# Patient Record
Sex: FEMALE | Race: WHITE | NOT HISPANIC OR LATINO | Employment: FULL TIME | URBAN - METROPOLITAN AREA
[De-identification: names, ages, dates, MRNs, and addresses within clinical notes are randomized per-mention and may not be internally consistent; named-entity substitution may affect disease eponyms.]

---
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
## 2022-01-08 ENCOUNTER — HOSPITAL ENCOUNTER (EMERGENCY)
Facility: CLINIC | Age: 22
Discharge: HOME | End: 2022-01-09
Attending: EMERGENCY MEDICINE | Admitting: EMERGENCY MEDICINE
Payer: OTHER GOVERNMENT

## 2022-01-08 DIAGNOSIS — U07.1 INFECTION DUE TO 2019 NOVEL CORONAVIRUS: ICD-10-CM

## 2022-01-08 DIAGNOSIS — R07.9 CHEST PAIN, UNSPECIFIED TYPE: ICD-10-CM

## 2022-01-08 PROCEDURE — 93005 ELECTROCARDIOGRAM TRACING: CPT

## 2022-01-08 PROCEDURE — 96374 THER/PROPH/DIAG INJ IV PUSH: CPT

## 2022-01-08 PROCEDURE — 99285 EMERGENCY DEPT VISIT HI MDM: CPT | Mod: 25

## 2022-01-09 ENCOUNTER — PATIENT OUTREACH (OUTPATIENT)
Dept: CARE COORDINATION | Facility: CLINIC | Age: 22
End: 2022-01-09

## 2022-01-09 ENCOUNTER — APPOINTMENT (OUTPATIENT)
Dept: GENERAL RADIOLOGY | Facility: CLINIC | Age: 22
End: 2022-01-09
Attending: EMERGENCY MEDICINE
Payer: OTHER GOVERNMENT

## 2022-01-09 VITALS
SYSTOLIC BLOOD PRESSURE: 122 MMHG | RESPIRATION RATE: 20 BRPM | TEMPERATURE: 98.7 F | OXYGEN SATURATION: 99 % | HEART RATE: 82 BPM | DIASTOLIC BLOOD PRESSURE: 56 MMHG

## 2022-01-09 LAB
ALBUMIN SERPL-MCNC: 3.7 G/DL (ref 3.4–5)
ALP SERPL-CCNC: 61 U/L (ref 40–150)
ALT SERPL W P-5'-P-CCNC: 24 U/L (ref 0–50)
ANION GAP SERPL CALCULATED.3IONS-SCNC: 5 MMOL/L (ref 3–14)
AST SERPL W P-5'-P-CCNC: 11 U/L (ref 0–45)
B-HCG FREE SERPL-ACNC: <5 IU/L (ref 0–5)
BASOPHILS # BLD AUTO: 0.1 10E3/UL (ref 0–0.2)
BASOPHILS NFR BLD AUTO: 1 %
BILIRUB SERPL-MCNC: 0.2 MG/DL (ref 0.2–1.3)
BUN SERPL-MCNC: 14 MG/DL (ref 7–30)
CALCIUM SERPL-MCNC: 9 MG/DL (ref 8.5–10.1)
CHLORIDE BLD-SCNC: 108 MMOL/L (ref 94–109)
CO2 SERPL-SCNC: 24 MMOL/L (ref 20–32)
CREAT SERPL-MCNC: 0.78 MG/DL (ref 0.52–1.04)
D DIMER PPP FEU-MCNC: <0.27 UG/ML FEU (ref 0–0.5)
EOSINOPHIL # BLD AUTO: 0.1 10E3/UL (ref 0–0.7)
EOSINOPHIL NFR BLD AUTO: 1 %
ERYTHROCYTE [DISTWIDTH] IN BLOOD BY AUTOMATED COUNT: 12.6 % (ref 10–15)
GFR SERPL CREATININE-BSD FRML MDRD: >90 ML/MIN/1.73M2
GLUCOSE BLD-MCNC: 85 MG/DL (ref 70–99)
HCT VFR BLD AUTO: 43.6 % (ref 35–47)
HGB BLD-MCNC: 14.3 G/DL (ref 11.7–15.7)
IMM GRANULOCYTES # BLD: 0 10E3/UL
IMM GRANULOCYTES NFR BLD: 0 %
LYMPHOCYTES # BLD AUTO: 3.4 10E3/UL (ref 0.8–5.3)
LYMPHOCYTES NFR BLD AUTO: 42 %
MCH RBC QN AUTO: 29.6 PG (ref 26.5–33)
MCHC RBC AUTO-ENTMCNC: 32.8 G/DL (ref 31.5–36.5)
MCV RBC AUTO: 90 FL (ref 78–100)
MONOCYTES # BLD AUTO: 0.7 10E3/UL (ref 0–1.3)
MONOCYTES NFR BLD AUTO: 9 %
NEUTROPHILS # BLD AUTO: 3.8 10E3/UL (ref 1.6–8.3)
NEUTROPHILS NFR BLD AUTO: 47 %
NRBC # BLD AUTO: 0 10E3/UL
NRBC BLD AUTO-RTO: 0 /100
PLATELET # BLD AUTO: 273 10E3/UL (ref 150–450)
POTASSIUM BLD-SCNC: 3.9 MMOL/L (ref 3.4–5.3)
PROT SERPL-MCNC: 7.5 G/DL (ref 6.8–8.8)
RBC # BLD AUTO: 4.83 10E6/UL (ref 3.8–5.2)
SODIUM SERPL-SCNC: 137 MMOL/L (ref 133–144)
TROPONIN I SERPL HS-MCNC: 3 NG/L
WBC # BLD AUTO: 8.1 10E3/UL (ref 4–11)

## 2022-01-09 PROCEDURE — 36415 COLL VENOUS BLD VENIPUNCTURE: CPT | Performed by: EMERGENCY MEDICINE

## 2022-01-09 PROCEDURE — 85025 COMPLETE CBC W/AUTO DIFF WBC: CPT | Performed by: EMERGENCY MEDICINE

## 2022-01-09 PROCEDURE — 85379 FIBRIN DEGRADATION QUANT: CPT | Performed by: EMERGENCY MEDICINE

## 2022-01-09 PROCEDURE — 250N000011 HC RX IP 250 OP 636: Performed by: EMERGENCY MEDICINE

## 2022-01-09 PROCEDURE — 84484 ASSAY OF TROPONIN QUANT: CPT | Performed by: EMERGENCY MEDICINE

## 2022-01-09 PROCEDURE — 80053 COMPREHEN METABOLIC PANEL: CPT | Performed by: EMERGENCY MEDICINE

## 2022-01-09 PROCEDURE — 84702 CHORIONIC GONADOTROPIN TEST: CPT

## 2022-01-09 PROCEDURE — 71045 X-RAY EXAM CHEST 1 VIEW: CPT

## 2022-01-09 RX ORDER — KETOROLAC TROMETHAMINE 15 MG/ML
15 INJECTION, SOLUTION INTRAMUSCULAR; INTRAVENOUS ONCE
Status: COMPLETED | OUTPATIENT
Start: 2022-01-09 | End: 2022-01-09

## 2022-01-09 RX ADMIN — KETOROLAC TROMETHAMINE 15 MG: 15 INJECTION, SOLUTION INTRAMUSCULAR; INTRAVENOUS at 00:53

## 2022-01-09 ASSESSMENT — ENCOUNTER SYMPTOMS
COUGH: 1
ABDOMINAL PAIN: 0
DIARRHEA: 0
CHILLS: 1
VOMITING: 1
NAUSEA: 0
HEADACHES: 0
FEVER: 1
SHORTNESS OF BREATH: 1
LIGHT-HEADEDNESS: 1

## 2022-01-09 NOTE — PROGRESS NOTES
Clinic Care Coordination Contact    Care Coordination ED Discharge Follow up Note    Patient referred for Virtual Home Monitoring Program for COVID-19.     Called and left message for patient encouraging them to schedule virtual visit with PCP and to watch for invitation from [employer] Phone number to reach both [facility] primary scheduling and Nurse Advisor line ([phone]) reviewed on message.      Farhana Jack RN  [facility]  - Clinic Care Coordinator

## 2022-01-09 NOTE — ED PROVIDER NOTES
History   Chief Complaint:  Chest Pain and Shortness of Breath       HPI   Belen Dunlap is a 21 year old female with history of anxiety, diagnosed with COVID-19 1/6/2022 presenting with chest pain and dyspnea.  Patient reports fever, chills and cough over the past few days though for the past day developing worsening dyspnea predominantly with coughing and on exertion as well as a chest pain.  She describes it she feels a constant chest pressure as though someone is sitting on her chest and it is difficult to inhale.  She denies any abdominal pain, diarrhea, headache and dysuria but does admit to an episode of vomiting, general fatigue and lightheadedness.  Patient admits to COVID vaccination as well as having her booster.  She denies any family history of early MI, underlying lung issues or blood clots though does admit to having Nexplanon.    Review of Systems   Constitutional: Positive for chills and fever.   Respiratory: Positive for cough and shortness of breath.    Cardiovascular: Positive for chest pain.   Gastrointestinal: Positive for vomiting. Negative for abdominal pain, diarrhea and nausea.   Neurological: Positive for light-headedness. Negative for headaches.   All other systems reviewed and are negative.        Allergies:  No Known Allergies    Medications:    albuterol (PROAIR HFA/PROVENTIL HFA/VENTOLIN HFA) 108 (90 Base) MCG/ACT inhaler  ARIPiprazole (ABILIFY) 20 MG tablet  NORTREL 0.5/35, 28, 0.5-35 MG-MCG tablet  QUEtiapine (SEROQUEL XR) 50 MG TB24 24 hr tablet        Past Medical History:    Anxiety   Depression COVID 19    Past Surgical History:      Past Surgical History:   Procedure Laterality Date     wisdom teeth extraction           Family History:      Family History   Problem Relation Age of Onset     Skin Cancer Mother         basal cell     Hyperlipidemia Father        Social History:  The patient presents to the ED alone  Denies smoking/drug use/ETOH    Physical Exam     Patient  Vitals for the past 24 hrs:   BP Temp Temp src Pulse Resp SpO2   01/08/22 2131 (!) 157/85 98.7  F (37.1  C) Oral 100 22 100 %       Physical Exam  Nursing note and vitals reviewed.  Constitutional: Well nourished. Resting comfortably.   Eyes: Conjunctiva normal.  Pupils are equal, round, and reactive to light.   ENT: Nose normal. Mucous membranes pink and moist.    Neck: Normal range of motion.  CVS: Normal rate, regular rhythm.  Normal heart sounds.  No murmur.  Pulmonary: Lungs clear to auscultation bilaterally. No wheezes/rales/rhonchi.  GI: Abdomen soft. Nontender, nondistended. No rigidity or guarding.    MSK: No calf tenderness or swelling.  Neuro: Alert. Follows simple commands.  Skin: Skin is warm and dry. No rash noted.   Psychiatric: Normal affect.       Emergency Department Course   ECG:  ECG taken at 2147  Normal sinus rhythm  Normal ECG  Rate 85 bpm. CT interval 132 ms. QRS duration 86 ms. QT/QTc 364/433 ms. P-R-T axes 55 62 39.    Imaging:  XR Chest Port 1 View    (Results Pending)     Report per radiology.     Laboratory:  Labs Ordered and Resulted from Time of ED Arrival to Time of ED Departure   D DIMER QUANTITATIVE - Normal       Result Value    D-Dimer Quantitative <0.27     COMPREHENSIVE METABOLIC PANEL - Normal    Sodium 137      Potassium 3.9      Chloride 108      Carbon Dioxide (CO2) 24      Anion Gap 5      Urea Nitrogen 14      Creatinine 0.78      Calcium 9.0      Glucose 85      Alkaline Phosphatase 61      AST 11      ALT 24      Protein Total 7.5      Albumin 3.7      Bilirubin Total 0.2      GFR Estimate >90     TROPONIN I - Normal    Troponin I High Sensitivity 3     ISTAT HCG QUANTITATIVE PREGNANCY POCT - Normal    HCG QUANTITATIVE POCT <5.0     CBC WITH PLATELETS AND DIFFERENTIAL    WBC Count 8.1      RBC Count 4.83      Hemoglobin 14.3      Hematocrit 43.6      MCV 90      MCH 29.6      MCHC 32.8      RDW 12.6      Platelet Count 273      % Neutrophils 47      % Lymphocytes 42       % Monocytes 9      % Eosinophils 1      % Basophils 1      % Immature Granulocytes 0      NRBCs per 100 WBC 0      Absolute Neutrophils 3.8      Absolute Lymphocytes 3.4      Absolute Monocytes 0.7      Absolute Eosinophils 0.1      Absolute Basophils 0.1      Absolute Immature Granulocytes 0.0      Absolute NRBCs 0.0         Emergency Department Course:  Reviewed:  I reviewed nursing notes, vitals, and past medical history    Assessments:   I performed a physical exam of the patient. Findings as above.     Patient rechecked and updated. Plan of care discussed and questions answered.       Interventions:  Medications   ketorolac (TORADOL) injection 15 mg (15 mg Intravenous Given 1/9/22 0053)       Disposition:  The patient was discharged to home.     Impression & Plan       Covid-19  Belen Dunlap was evaluated during a global COVID-19 pandemic, which necessitated consideration that the patient might be at risk for infection with the SARS-CoV-2 virus that causes COVID-19.   Applicable protocols for evaluation were followed during the patient's care.   COVID-19 was considered as part of the patient's evaluation. The plan for testing is:  a test was obtained at a previous visit and reviewed & considered today.      Medical Decision Making:  Belen Dunlap is a 21 year old female who was recently diagnosed with COVID-19 presenting with a myriad of complaints though predominantly chest pain and dyspnea.  She is nontoxic, clinically well-hydrated, in no significant distress on arrival.  EKG without focal ischemia or underlying arrhythmia.  High-sensitivity chest screening troponin negative.  Her presentation would be atypical of ACS given chronicity of symptoms.  D-dimer negative, clinically doubt PE.  Chest x-ray without focal pneumonia, fluid overload, widened mediastinum or pneumothorax.  Patient has no abdominal tenderness and I doubt intra-abdominal source to explain her pain.  Labs overall reassuring without  evidence to suggest underlying sepsis, profound anemia or significant electrolyte derangements.  Strong suspicion that her chest pain may be secondary to more pleurisy.  Patient did report symptom improvement after IV Toradol.  She ambulates without hypoxia or significant work of breathing.  I did recommend Tylenol/Motrin as needed with continued p.o. hydration.  Patient will be discharged home with COVID get well referral loop.  Monitor pulse ox and should it be under 90%, if she develop worsening chest pain or dyspnea to promptly represent to the ED.  All questions addressed.    Diagnosis:    ICD-10-CM    1. Infection due to 2019 novel coronavirus  U07.1    2. Chest pain, unspecified type  R07.9        Discharge Medications:  New Prescriptions    No medications on file            Haley Franco,   01/09/22 0140

## 2022-01-09 NOTE — ED TRIAGE NOTES
Pt arrives with c/o chest heaviness and SOB. Pt tested positive for COVID on Thursday. Last tylenol this morning. ABCs intact in triage.

## 2022-01-09 NOTE — DISCHARGE INSTRUCTIONS
Discharge Instructions  COVID-19    COVID-19 is the disease caused by a new coronavirus. The virus spreads from person-to-person primarily by droplets when an infected person coughs or sneezes and the droplets are then breathed in by another person. There are tests available to diagnose COVID-19. You may have been diagnosed with COVID, may be being tested for COVID and have a pending test result, or may have been exposed to COVID.    Symptoms of COVID-19  Many people have no symptoms or mild symptoms.  Symptoms may usually appear 4 to 5 days (up to 14 days) after contact with a person with COVID-19. Some people will get severe symptoms and pneumonia. Usual symptoms are:     ? Fever  ? Cough  ? Trouble breathing    Less common symptoms are: Headache, body aches, sore throat, sneezing, diarrhea, loss of taste or smell.    Isolation and Quarantine    You may have been seen because you have symptoms, had an exposure, or had some other concern about possible COVID. The best way to stop the spread of the virus is to avoid contact with others.    Isolation refers to sick people staying away from people who are not sick. A person in quarantine is limiting activity because they were exposed and are waiting to see if they might become sick.    If you test positive for COVID, you should stay home (isolation) for at least 10 days after your symptoms began, and for 24 hours with no fever and improvement of symptoms--whichever is longer. (Your fever should be gone for 24 hours without using fever-reducing medicine). If you have no symptoms, you should stay home (isolation) for 10 days from the day of the test. If you have been vaccinated for COVID, the vaccination will not cause you to test positive so a positive test result generally is a  true positive .    For example, if you have a fever and cough for 6 days, you need to stay home 4 more days with no fever for a total of 10 days. Or, if you have a fever and cough for 10 days,  you need to stay home one more day with no fever for a total of 11 days.    If you have a high-risk exposure to COVID (you spent 15 minutes or more within six feet of somebody who has COVID), you should stay home (quarantine) for 14 days, unless you are vaccinated. Even if you test negative for COVID, the CDC recommends a 14-day quarantine from the time of your last exposure to that individual (unless you are vaccinated). There are options for a shortened (<14 day quarantine) you can review at:  [email or web address]/close.html#long    If you live in the same house as somebody with COVID and cannot separate from them, you will need to quarantine for 14-days after that person's isolation (infectious) period. That means that you may need to quarantine for 24-days after that person became symptomatic/ill.    If you are vaccinated and do not develop symptoms, you do not need to quarantine after exposure.    If you have symptoms but a negative test, you should stay at home until you are symptom-free and without fever for 24 hours, using the same judgment you would for when it is safe to return to work/school from strep throat, influenza, or the common cold. If you worsen, you should consider being re-evaluated.    If you are being tested for COVID because of symptoms and your test is pending, you should stay home until you know your test result.    If I have COVID, how should I protect myself and others?    Do not go to work or school. Have a friend or relative do your shopping. Do not use public transportation (bus, train) or ridesharing (Lyft, Uber).    Separate yourself from other people in your home. As much as possible, you should stay in one room and away from other people in your home. Also, use a separate bathroom, if possible. Avoid handling pets or other animals while sick.     Wear a facemask if you need to be around other people and cover your mouth and nose with a tissue when  you cough or sneeze.     Avoid sharing personal household items. You should not share dishes, drinking glasses, forks/knives/spoons, towels, or bedding with other people in your home. After using these items, they should be washed with soap and water. Clean parts of your home that are touched often (doorknobs, faucets, countertops, etc.) daily.     Wash your hands often with soap and water for at least 20 seconds or use an alcohol-based hand  containing at least 60% alcohol.     Avoid touching your face.    Treat your symptoms. You can take Acetaminophen (Tylenol) to treat body aches and fever as needed for comfort. Ibuprofen (Advil or Motrin) can be used as well if you still have symptoms after taking Tylenol. Drink fluids. Rest.    Watch for worsening symptoms such as shortness of breath/difficulty breathing or very severe weakness.    Employers/workplaces are being asked by the Centers for Disease Control (CDC) to not request notes/documentation for you to return to work or prove that you were ill. You may choose to show your employer this paperwork. Also, repeat testing should not be required to return to work.    Exercise/Sports in rare cases, COVID could affect your heart in a way that makes exercise or participation in sports dangerous.    If you have a mild COVID illness (fever, cough, sore throat, and similar symptoms but no difficulty breathing or abnormalities of the lung): After your COVID symptoms have resolved, wait 14-days before returning to activity.  If you have more than a mild illness (meaning that you have problems with your breathing or lungs) or if you participate in competitive or strenuous activity or have a history of heart disease: Please see your primary doctor/provider prior to return to activity/competition.    Antibody treatments are available for patients with mild to moderate COVID illness in order to prevent severe illness. In general, only patients with risk factors for  severe illness are eligible for treatment. For more information, to see if you are eligible, and to find treatment, go to the [facility]:  [email or web address]/[email or web address]     Return to the Emergency Department if:    If you are developing worsening breathing, shortness of breath, or feel worse you should seek medical attention.  If you are uncertain, contact your health care provider/clinic. If you need emergency medical attention, call 911 and tell them you have been ill.

## 2022-01-10 LAB
ATRIAL RATE - MUSE: 85 BPM
DIASTOLIC BLOOD PRESSURE - MUSE: NORMAL MMHG
INTERPRETATION ECG - MUSE: NORMAL
P AXIS - MUSE: 55 DEGREES
PR INTERVAL - MUSE: 132 MS
QRS DURATION - MUSE: 86 MS
QT - MUSE: 364 MS
QTC - MUSE: 433 MS
R AXIS - MUSE: 62 DEGREES
SYSTOLIC BLOOD PRESSURE - MUSE: NORMAL MMHG
T AXIS - MUSE: 39 DEGREES
VENTRICULAR RATE- MUSE: 85 BPM

## 2023-01-25 NOTE — PHARMACY-ADMISSION MEDICATION HISTORY
Admission medication history interview status for the 7/26/2020 admission is complete. See Epic admission navigator for allergy information, pharmacy, prior to admission medications and immunization status.     Medication history interview sources:    - Patient , and dispense history     Changes made to PTA medication list (reason)  Added:   - Albuterol 108 ( 90 base) MCG/ACT inhaler   Deleted:   - none   Changed:   - none     Additional medication history information (including reliability of information, actions taken by pharmacist)  - Patient was historian and knows her medications well.        Prior to Admission medications    Medication Sig Last Dose Taking? Auth Provider   albuterol (PROAIR HFA/PROVENTIL HFA/VENTOLIN HFA) 108 (90 Base) MCG/ACT inhaler Inhale 2 puffs into the lungs every 6 hours as needed for shortness of breath / dyspnea or wheezing Past Week Yes Unknown, Entered By History   ARIPiprazole (ABILIFY) 20 MG tablet Take 20 mg by mouth daily 7/25/2020 at Unknown time Yes Reported, Patient   NORTREL 0.5/35, 28, 0.5-35 MG-MCG tablet Take 0.5-35 mg by mouth daily 7/25/2020 at Unknown time Yes Reported, Patient   omeprazole (PRILOSEC) 20 MG DR capsule Take 20 mg by mouth daily 7/25/2020 at Unknown time Yes Reported, Patient   QUEtiapine (SEROQUEL XR) 50 MG TB24 24 hr tablet Take 50 mg by mouth daily 7/25/2020 at Unknown time Yes Reported, Patient         Medication history completed by: Jacob Kunz PD3   Cuca Perez Yesy Akers 1996               OFFICE NOTE          Mian Hernandez   1951  [id number]    Date/Time:  1/26/2023  Cardiologist:  Rafiq Reynaga M.D.,ZOE.    SUBJECTIVE:  Pleasantly forgetful. His memory is significantly impaired. His family tells me he does not complain of chest pain, palpitations or shortness of breath. Reviewed stress test results - with partial reversibility around infarction in the presence of no anginal symptoms we all agreed to just continue medical management of CAD and not to proceed with invasive procedures. BP well controlled, lipids at goal.  Discussed results of abdominal duplex as well which did not show any new aneurysms. Assessment/Plan:    1. CAD: Status post MI in 2018 status post PCI of the circumflex in 2018 in [place]. At the time the cardiac catheterization revealed an ejection fraction of 50%, 20% left main stenosis 70% distal LAD and 50% mid RCA stenosis and 40% of PDA. Once again the circumflex completely occluded at that time and he underwent a PCI with a 3.0 x 16 mm Synergy stent. Discussed nuclear stress test results as below and decided to proceed with medical management in the absence of anginal symptoms. Continue ASA, plavix, coreg, statin. He will follow up with Dr. Eloy Montiel again in 6 months. NUCLEAR CARDIAC STRESS TEST 01/25/2023     Interpretation Summary    Nuclear Findings: There is a mild severity left ventricular stress perfusion defect that is small to medium in size present in the basal to mid anterolateral segment(s) that is partially reversible. There is normal wall motion in the defect area. The defect appears to be probable infarction and probable ischemia. Nuclear Findings: Normal left ventricular systolic function post-stress. ECG: Resting ECG demonstrates normal sinus rhythm and incomplete right bundle branch block. ECG: Stress ECG was negative for ischemia.     Stress Test: A pharmacological stress test was performed using lexiscan. Hemodynamics are adequate for diagnosis. Blood pressure demonstrated a normal response and heart rate demonstrated a normal response to stress. The patient's heart rate recovery was normal.    Post-stress ejection fraction is 60%. Stress Combined Conclusion: mild ischemia with mix infarction at the level of the proximal to mid high lateral wall EF 60%    Signed by: Jono Mayorga MD on 1/25/2023  4:49 PM      2. History of distal thoracic aortic and abdominal aneurysm status post stenting in 2018 - recent abdominal duplex did not show any evidence of abdominal aorta dilatation with maximum diameter of 2.4cm, distal stents is not well visualized     3. Memory impairment: Significant. 4.  Hypertension: Well controlled today, he will continue hydralazine 50mg TID, coreg    5. Hyperlipidemia: Continue Lipitor. Closely followed by his primary care physician. LDL goal less than 70 and last LDL was 60      HPI   Patient is a 73-year-old male recently relocated from [facility] with a past medical history markable for hypertension, hyperlipidemia, coronary disease, aortic disease, and memory impairment presents today for routine follow-up. Unfortunately I do not have at this time any records from [facility] and the patient is not able to tell me much about his past medical history. Nonetheless he seems to be relatively asymptomatic from the cardiac standpoint. CARDIAC STUDIES    01/24/23    NUCLEAR CARDIAC STRESS TEST 01/25/2023 1/25/2023    Interpretation Summary    Nuclear Findings: There is a mild severity left ventricular stress perfusion defect that is small to medium in size present in the basal to mid anterolateral segment(s) that is partially reversible. There is normal wall motion in the defect area. The defect appears to be probable infarction and probable ischemia.     Nuclear Findings: Normal left ventricular systolic function post-stress. ECG: Resting ECG demonstrates normal sinus rhythm and incomplete right bundle branch block. ECG: Stress ECG was negative for ischemia. Stress Test: A pharmacological stress test was performed using lexiscan. Hemodynamics are adequate for diagnosis. Blood pressure demonstrated a normal response and heart rate demonstrated a normal response to stress. The patient's heart rate recovery was normal.    Post-stress ejection fraction is 60%. Stress Combined Conclusion: mild ischemia with mix infarction at the level of the proximal to mid high lateral wall EF 60%    Signed by: Paul Sánchez MD on 1/25/2023  4:49 PM        EKG Results       Procedure 720 Value Units Date/Time    AMB POC EKG ROUTINE W/ 12 LEADS, INTER & REP [[id number]] Resulted: 01/26/23 1407    Order Status: Completed Updated: 01/26/23 1407            IMAGING      MRI Results (most recent):  Results from [place] encounter on 01/20/22    MRI LUMB SPINE WO CONT    Narrative  EXAM: MRI LUMB SPINE WO CONT    INDICATION: . Radiculopathy, lumbar region    COMPARISON: None    TECHNIQUE: MR imaging of the lumbar spine was performed using the following  sequences: sagittal T1, T2, STIR;  axial T1, T2.    CONTRAST:  None. FINDINGS:  Vertebral body heights are maintained. There is no listhesis. There is no acute marrow replacement. The lumbar spinal canal is small on a congenital basis. The conus medullaris terminates at T12. Signal and caliber of the distal spinal  cord are within normal limits. Lower thoracic spine: No herniation or stenosis. L1-L2: No herniation or stenosis. L2-L3: No herniation or stenosis. L3-L4: Mild spinal stenosis. Diffuse disc bulge. L4-L5: Severe spinal stenosis. Large focal disc protrusion arising centrally and  extending, foraminal and extra foraminal. Facet arthrosis. Bilateral foraminal  stenosis. Severe disc degeneration. L5-S1: Mild to moderate spinal stenosis.  Diffuse disc bulge. Facet arthrosis. Bilateral foraminal stenosis. Severe disc degeneration. Impression  Multilevel degenerative disc/facet disease as detailed, severe at  L4-5. CT Results (most recent):  No results found for this or any previous visit. XR Results (most recent):  Results from Hospital Encounter encounter on 11/22/21    XR SPINE LUMB 2 OR 3 V    Narrative  Lumbar spine 3 views dated 11/20/2021    History is chronic midline low back pain    AP, lateral and spot lateral views of the lumbar spine were obtained. There is  evidence of mild lumbar scoliosis convex to the left. There is evidence of  lumbar spondylosis. Marked intervertebral disc space narrowing is noted at the  L4-5 level as well as the L5-S1 level. A vacuum phenomenon is noted at the L4-5  level. There is evidence of sclerotic change involving the adjacent endplates. This is indicative of degenerative disc disease. There is no evidence of  spondylolisthesis. Metallic stents are associated with the abdominal aorta and proximal iliac  arteries    Impression  1. Evidence of moderate degenerative change involving the lumbar spine. 2. Evidence of lumbar scoliosis convex to the left. No past medical history on file. No past surgical history on file. Social History     Tobacco Use    Smoking status: Former     Packs/day: 0.50     Years: 39.00     Pack years: 19.50     Types: Cigarettes     Start date: 4/12/1970     Quit date: 4/12/2019     Years since quitting: 3.7    Smokeless tobacco: Former   Vaping Use    Vaping Use: Never used   Substance Use Topics    Alcohol use: Not Currently    Drug use: Never     No family history on file.   No Known Allergies      Visit Vitals  /78 (BP 1 Location: Left upper arm, BP Patient Position: Sitting, BP Cuff Size: Adult)   Pulse 89   Resp 18   Ht 5' 10\" (1.778 m)   Wt 171 lb 12.8 oz (77.9 kg)   SpO2 98%   BMI 24.65 kg/m²         Last 3 Recorded Weights in this Encounter    01/26/23 1359 Weight: 171 lb 12.8 oz (77.9 kg)            Review of Systems:   Pertinent items are noted in the History of Present Illness. Visit Vitals  /78 (BP 1 Location: Left upper arm, BP Patient Position: Sitting, BP Cuff Size: Adult)   Pulse 89   Resp 18   Ht 5' 10\" (1.778 m)   Wt 171 lb 12.8 oz (77.9 kg)   SpO2 98%   BMI 24.65 kg/m²     General Appearance:  Well developed, well nourished,alert and oriented x 3, and individual in no acute distress. Ears/Nose/Mouth/Throat:   Hearing grossly normal.         Neck: Supple. Chest:   Lungs clear to auscultation bilaterally. Cardiovascular:  Regular rate and rhythm, S1, S2 normal, no murmur. Abdomen:   Soft, non-tender, bowel sounds are active. Extremities: No edema bilaterally. Skin: Warm and dry. Current Outpatient Medications on File Prior to Visit   Medication Sig Dispense Refill    metFORMIN ER (GLUCOPHAGE XR) 500 mg tablet TAKE 1 TABLET(500 MG) BY MOUTH DAILY WITH BREAKFAST 90 Tablet 1    clopidogreL (PLAVIX) 75 mg tab TAKE 1 TABLET BY MOUTH DAILY 90 Tablet 3    LORazepam (ATIVAN) 0.5 mg tablet Take 1 Tablet by mouth two (2) times daily as needed for Agitation (sundowning). Max Daily Amount: 1 mg. 90 Tablet 2    Namenda XR 28 mg capsule Take 1 Capsule by mouth daily. 120 Capsule 3    QUEtiapine (SEROquel) 25 mg tablet Take 12.5 in the AM and 12.5 mg  Tablet 3    carvediloL (COREG) 6.25 mg tablet TAKE 1 TABLET BY MOUTH TWICE DAILY 180 Tablet 3    dutasteride (AVODART) 0.5 mg capsule Take 0.5 mg by mouth daily. atorvastatin (LIPITOR) 40 mg tablet Take 40 mg by mouth nightly. sertraline (ZOLOFT) 100 mg tablet Take 100 mg by mouth daily. tamsulosin (FLOMAX) 0.4 mg capsule Take 0.4 mg by mouth daily. aspirin 81 mg cap Take 1 Tablet by mouth daily. multivitamin (ONE A DAY) tablet Take 1 Tablet by mouth daily. cyanocobalamin 1,000 mcg tablet Take 1,000 mcg by mouth daily.       [DISCONTINUED] hydrALAZINE (APRESOLINE) 50 mg tablet Take 50 mg by mouth three (3) times daily. [DISCONTINUED] hydrALAZINE (APRESOLINE) 25 mg tablet Take 1 Tablet by mouth three (3) times daily. 270 Tablet 1     No current facility-administered medications on file prior to visit. Sylvia Leonardo had no medications administered during this visit. Current Outpatient Medications   Medication Sig    hydrALAZINE (APRESOLINE) 50 mg tablet Take 1 Tablet by mouth three (3) times daily. metFORMIN ER (GLUCOPHAGE XR) 500 mg tablet TAKE 1 TABLET(500 MG) BY MOUTH DAILY WITH BREAKFAST    clopidogreL (PLAVIX) 75 mg tab TAKE 1 TABLET BY MOUTH DAILY    LORazepam (ATIVAN) 0.5 mg tablet Take 1 Tablet by mouth two (2) times daily as needed for Agitation (sundowning). Max Daily Amount: 1 mg. Namenda XR 28 mg capsule Take 1 Capsule by mouth daily. QUEtiapine (SEROquel) 25 mg tablet Take 12.5 in the AM and 12.5 mg PM    carvediloL (COREG) 6.25 mg tablet TAKE 1 TABLET BY MOUTH TWICE DAILY    dutasteride (AVODART) 0.5 mg capsule Take 0.5 mg by mouth daily. atorvastatin (LIPITOR) 40 mg tablet Take 40 mg by mouth nightly. sertraline (ZOLOFT) 100 mg tablet Take 100 mg by mouth daily. tamsulosin (FLOMAX) 0.4 mg capsule Take 0.4 mg by mouth daily. aspirin 81 mg cap Take 1 Tablet by mouth daily. multivitamin (ONE A DAY) tablet Take 1 Tablet by mouth daily. cyanocobalamin 1,000 mcg tablet Take 1,000 mcg by mouth daily. No current facility-administered medications for this visit.          Lab Results   Component Value Date/Time    Cholesterol, total 139 10/12/2022 12:08 PM    HDL Cholesterol 55 10/12/2022 12:08 PM    LDL, calculated 60.8 10/12/2022 12:08 PM    VLDL, calculated 23.2 10/12/2022 12:08 PM    Triglyceride 116 10/12/2022 12:08 PM    CHOL/HDL Ratio 2.5 10/12/2022 12:08 PM       Lab Results   Component Value Date/Time    Sodium 139 10/12/2022 12:08 PM    Potassium 4.8 10/12/2022 12:08 PM    Chloride 108 10/12/2022 12:08 PM CO2 26 10/12/2022 12:08 PM    Anion gap 5 10/12/2022 12:08 PM    Glucose 131 (H) 10/12/2022 12:08 PM    BUN 34 (H) 10/12/2022 12:08 PM    Creatinine 1.32 (H) 10/12/2022 12:08 PM    BUN/Creatinine ratio 26 (H) 10/12/2022 12:08 PM    GFR est AA 59 (L) 11/22/2021 12:00 AM    GFR est non-AA 51 (L) 11/22/2021 12:00 AM    Calcium 9.2 10/12/2022 12:08 PM       Lab Results   Component Value Date/Time    ALT (SGPT) 40 10/12/2022 12:08 PM    Alk.  phosphatase 89 10/12/2022 12:08 PM    Bilirubin, total 0.4 10/12/2022 12:08 PM       Lab Results   Component Value Date/Time    WBC 5.5 10/12/2022 12:08 PM    HGB 10.2 (L) 10/12/2022 12:08 PM    HCT 33.8 (L) 10/12/2022 12:08 PM    PLATELET 438 58/90/6681 12:08 PM    MCV 82.8 10/12/2022 12:08 PM       No results found for: TSH, TSH2, TSH3, TSHP, TSHEXT, TSHEXT      Lab Results   Component Value Date/Time    Cholesterol, total 139 10/12/2022 12:08 PM    Cholesterol, total 141 11/22/2021 12:00 AM    HDL Cholesterol 55 10/12/2022 12:08 PM    HDL Cholesterol 47 11/22/2021 12:00 AM    LDL, calculated 60.8 10/12/2022 12:08 PM    LDL, calculated 70 11/22/2021 12:00 AM    Triglyceride 116 10/12/2022 12:08 PM    Triglyceride 139 11/22/2021 12:00 AM    CHOL/HDL Ratio 2.5 10/12/2022 12:08 PM        ANNI Mliler, [facility]   [address] [place] [address], [address] [place] [address] [address]  [address]  (H) [phone] (XMD) [phone]

## 2023-08-02 ENCOUNTER — OFFICE VISIT (OUTPATIENT)
Dept: URGENT CARE | Facility: URGENT CARE | Age: 23
End: 2023-08-02
Payer: COMMERCIAL

## 2023-08-02 VITALS
OXYGEN SATURATION: 100 % | TEMPERATURE: 98.5 F | BODY MASS INDEX: 32.44 KG/M2 | DIASTOLIC BLOOD PRESSURE: 78 MMHG | HEART RATE: 77 BPM | SYSTOLIC BLOOD PRESSURE: 123 MMHG | HEIGHT: 64 IN | WEIGHT: 190 LBS | RESPIRATION RATE: 16 BRPM

## 2023-08-02 DIAGNOSIS — R07.0 THROAT PAIN: Primary | ICD-10-CM

## 2023-08-02 PROCEDURE — 85048 AUTOMATED LEUKOCYTE COUNT: CPT | Performed by: FAMILY MEDICINE

## 2023-08-02 PROCEDURE — 99203 OFFICE O/P NEW LOW 30 MIN: CPT | Performed by: FAMILY MEDICINE

## 2023-08-02 PROCEDURE — 85004 AUTOMATED DIFF WBC COUNT: CPT | Performed by: FAMILY MEDICINE

## 2023-08-02 PROCEDURE — 36415 COLL VENOUS BLD VENIPUNCTURE: CPT | Performed by: FAMILY MEDICINE

## 2023-08-02 RX ORDER — PREDNISONE 20 MG/1
60 TABLET ORAL DAILY
Qty: 9 TABLET | Refills: 0 | Status: SHIPPED | OUTPATIENT
Start: 2023-08-02 | End: 2023-08-05

## 2023-08-02 NOTE — PATIENT INSTRUCTIONS
Tomorrow start prednisone 60 mg once daily.  Take this every morning for 3 days to reduce swelling in your tonsils.    We will be in touch with the results of your white blood cell count if there is any abnormality.

## 2023-08-03 LAB
BASOPHILS # BLD AUTO: 0.1 10E3/UL (ref 0–0.2)
BASOPHILS NFR BLD AUTO: 1 %
EOSINOPHIL # BLD AUTO: 0.3 10E3/UL (ref 0–0.7)
EOSINOPHIL NFR BLD AUTO: 3 %
IMM GRANULOCYTES # BLD: 0 10E3/UL
IMM GRANULOCYTES NFR BLD: 0 %
LYMPHOCYTES # BLD AUTO: 2.3 10E3/UL (ref 0.8–5.3)
LYMPHOCYTES NFR BLD AUTO: 26 %
MONOCYTES # BLD AUTO: 0.9 10E3/UL (ref 0–1.3)
MONOCYTES NFR BLD AUTO: 10 %
NEUTROPHILS # BLD AUTO: 5.4 10E3/UL (ref 1.6–8.3)
NEUTROPHILS NFR BLD AUTO: 60 %
NRBC # BLD AUTO: 0 10E3/UL
NRBC BLD AUTO-RTO: 0 /100
WBC # BLD AUTO: 8.9 10E3/UL (ref 4–11)

## 2023-08-03 NOTE — PROGRESS NOTES
"  ICD-10-CM    1. Throat pain  R07.0 Streptococcus A Rapid Screen w/Reflex to PCR - Clinic Collect     WBC with Diff     predniSONE (DELTASONE) 20 MG tablet     WBC with Diff        Assessment: Pharyngitis. Rapid strep negative on 7/27/23 from outside clinic. Awaiting WBC w/diff results to help guide treatment  No evidence of peritonsillar abscess at this time.  If WBC elevated and/or significant left shift, would treat with course of Augmentin.    PLAN:  Patient Instructions   Tomorrow start prednisone 60 mg once daily.  Take this every morning for 3 days to reduce swelling in your tonsils.    We will be in touch with the results of your white blood cell count if there is any abnormality.    SUBJECTIVE:  Belen has been having pharyngitis for 1 week. She had been diagnosed with strep in the beginning of July which improved with antibiotics. She did not have any discomfort for a week or 2 and then last week throat pain returned. She describes having chills and night sweats. She denies headache.     OBJECTIVE:  /78   Pulse 77   Temp 98.5  F (36.9  C) (Oral)   Resp 16   Ht 1.626 m (5' 4\")   Wt 86.2 kg (190 lb)   LMP 07/26/2023 (Approximate)   SpO2 100%   BMI 32.61 kg/m    Gen: well appearing, in NAD  ENT: tympanic membranes normal bilaterally, normal mucosa, moderate pharyngeal erythema without exudate. One small tonsillith L tonsil lower anterior area.  Neck: mild anterior cervical LAD noted, no posterior LAD noted  "

## 2023-08-06 ENCOUNTER — HOSPITAL ENCOUNTER (EMERGENCY)
Facility: CLINIC | Age: 23
Discharge: HOME | End: 2023-08-07
Attending: EMERGENCY MEDICINE | Admitting: EMERGENCY MEDICINE
Payer: COMMERCIAL

## 2023-08-06 DIAGNOSIS — J02.9 PHARYNGITIS, UNSPECIFIED ETIOLOGY: ICD-10-CM

## 2023-08-06 PROCEDURE — 36415 COLL VENOUS BLD VENIPUNCTURE: CPT | Performed by: EMERGENCY MEDICINE

## 2023-08-06 PROCEDURE — 96375 TX/PRO/DX INJ NEW DRUG ADDON: CPT

## 2023-08-06 PROCEDURE — 87637 SARSCOV2&INF A&B&RSV AMP PRB: CPT | Performed by: EMERGENCY MEDICINE

## 2023-08-06 PROCEDURE — 96374 THER/PROPH/DIAG INJ IV PUSH: CPT

## 2023-08-06 PROCEDURE — 94640 AIRWAY INHALATION TREATMENT: CPT

## 2023-08-06 PROCEDURE — 250N000011 HC RX IP 250 OP 636: Mod: JZ | Performed by: EMERGENCY MEDICINE

## 2023-08-06 PROCEDURE — 96361 HYDRATE IV INFUSION ADD-ON: CPT

## 2023-08-06 PROCEDURE — 250N000013 HC RX MED GY IP 250 OP 250 PS 637: Performed by: EMERGENCY MEDICINE

## 2023-08-06 PROCEDURE — 84703 CHORIONIC GONADOTROPIN ASSAY: CPT | Performed by: EMERGENCY MEDICINE

## 2023-08-06 PROCEDURE — 99285 EMERGENCY DEPT VISIT HI MDM: CPT | Mod: 25

## 2023-08-06 PROCEDURE — 80048 BASIC METABOLIC PNL TOTAL CA: CPT | Performed by: EMERGENCY MEDICINE

## 2023-08-06 PROCEDURE — 87651 STREP A DNA AMP PROBE: CPT | Performed by: EMERGENCY MEDICINE

## 2023-08-06 PROCEDURE — 85025 COMPLETE CBC W/AUTO DIFF WBC: CPT | Performed by: EMERGENCY MEDICINE

## 2023-08-06 PROCEDURE — 86308 HETEROPHILE ANTIBODY SCREEN: CPT | Performed by: EMERGENCY MEDICINE

## 2023-08-06 PROCEDURE — 258N000003 HC RX IP 258 OP 636: Performed by: EMERGENCY MEDICINE

## 2023-08-06 RX ORDER — ACETAMINOPHEN 325 MG/1
650 TABLET ORAL ONCE
Status: COMPLETED | OUTPATIENT
Start: 2023-08-06 | End: 2023-08-06

## 2023-08-06 RX ORDER — DEXAMETHASONE SODIUM PHOSPHATE 10 MG/ML
10 INJECTION, SOLUTION INTRAMUSCULAR; INTRAVENOUS ONCE
Status: COMPLETED | OUTPATIENT
Start: 2023-08-06 | End: 2023-08-06

## 2023-08-06 RX ORDER — KETOROLAC TROMETHAMINE 15 MG/ML
15 INJECTION, SOLUTION INTRAMUSCULAR; INTRAVENOUS ONCE
Status: COMPLETED | OUTPATIENT
Start: 2023-08-06 | End: 2023-08-06

## 2023-08-06 RX ADMIN — RACEPINEPHRINE HYDROCHLORIDE 0.5 ML: 11.25 SOLUTION RESPIRATORY (INHALATION) at 23:45

## 2023-08-06 RX ADMIN — KETOROLAC TROMETHAMINE 15 MG: 15 INJECTION, SOLUTION INTRAMUSCULAR; INTRAVENOUS at 23:41

## 2023-08-06 RX ADMIN — ACETAMINOPHEN 650 MG: 325 TABLET, FILM COATED ORAL at 23:02

## 2023-08-06 RX ADMIN — SODIUM CHLORIDE 1000 ML: 9 INJECTION, SOLUTION INTRAVENOUS at 23:41

## 2023-08-06 RX ADMIN — DEXAMETHASONE SODIUM PHOSPHATE 10 MG: 10 INJECTION INTRAMUSCULAR; INTRAVENOUS at 23:41

## 2023-08-06 ASSESSMENT — ACTIVITIES OF DAILY LIVING (ADL): ADLS_ACUITY_SCORE: 35

## 2023-08-06 NOTE — LETTER
August 7, 2023      To Whom It May Concern:      Belen HUNTER [place] was seen in our Emergency Department today, 08/07/23.  She may return to work/school when improved.    Sincerely,        Jessie Blood RN

## 2023-08-07 ENCOUNTER — APPOINTMENT (OUTPATIENT)
Dept: CT IMAGING | Facility: CLINIC | Age: 23
End: 2023-08-07
Attending: EMERGENCY MEDICINE
Payer: COMMERCIAL

## 2023-08-07 VITALS
SYSTOLIC BLOOD PRESSURE: 152 MMHG | RESPIRATION RATE: 20 BRPM | HEART RATE: 79 BPM | OXYGEN SATURATION: 99 % | DIASTOLIC BLOOD PRESSURE: 115 MMHG | TEMPERATURE: 97 F

## 2023-08-07 LAB
ANION GAP SERPL CALCULATED.3IONS-SCNC: 13 MMOL/L (ref 7–15)
BASOPHILS # BLD AUTO: 0.1 10E3/UL (ref 0–0.2)
BASOPHILS NFR BLD AUTO: 1 %
BUN SERPL-MCNC: 15.1 MG/DL (ref 6–20)
CALCIUM SERPL-MCNC: 9.3 MG/DL (ref 8.6–10)
CHLORIDE SERPL-SCNC: 103 MMOL/L (ref 98–107)
CREAT SERPL-MCNC: 0.69 MG/DL (ref 0.51–0.95)
DEPRECATED HCO3 PLAS-SCNC: 23 MMOL/L (ref 22–29)
EOSINOPHIL # BLD AUTO: 0.2 10E3/UL (ref 0–0.7)
EOSINOPHIL NFR BLD AUTO: 1 %
ERYTHROCYTE [DISTWIDTH] IN BLOOD BY AUTOMATED COUNT: 12.1 % (ref 10–15)
FLUAV RNA SPEC QL NAA+PROBE: NEGATIVE
FLUBV RNA RESP QL NAA+PROBE: NEGATIVE
GFR SERPL CREATININE-BSD FRML MDRD: >90 ML/MIN/1.73M2
GLUCOSE SERPL-MCNC: 88 MG/DL (ref 70–99)
GROUP A STREP BY PCR: NOT DETECTED
HCG SERPL QL: NEGATIVE
HCT VFR BLD AUTO: 37.9 % (ref 35–47)
HGB BLD-MCNC: 12.9 G/DL (ref 11.7–15.7)
IMM GRANULOCYTES # BLD: 0.3 10E3/UL
IMM GRANULOCYTES NFR BLD: 1 %
LACTATE SERPL-SCNC: 0.6 MMOL/L (ref 0.7–2)
LYMPHOCYTES # BLD AUTO: 4.2 10E3/UL (ref 0.8–5.3)
LYMPHOCYTES NFR BLD AUTO: 21 %
MCH RBC QN AUTO: 30.4 PG (ref 26.5–33)
MCHC RBC AUTO-ENTMCNC: 34 G/DL (ref 31.5–36.5)
MCV RBC AUTO: 89 FL (ref 78–100)
MONOCYTES # BLD AUTO: 1.2 10E3/UL (ref 0–1.3)
MONOCYTES NFR BLD AUTO: 6 %
MONOCYTES NFR BLD AUTO: NEGATIVE %
NEUTROPHILS # BLD AUTO: 14.2 10E3/UL (ref 1.6–8.3)
NEUTROPHILS NFR BLD AUTO: 70 %
NRBC # BLD AUTO: 0 10E3/UL
NRBC BLD AUTO-RTO: 0 /100
PLAT MORPH BLD: NORMAL
PLATELET # BLD AUTO: 290 10E3/UL (ref 150–450)
POTASSIUM SERPL-SCNC: 3.8 MMOL/L (ref 3.4–5.3)
RBC # BLD AUTO: 4.24 10E6/UL (ref 3.8–5.2)
RBC MORPH BLD: NORMAL
RSV RNA SPEC NAA+PROBE: NEGATIVE
SARS-COV-2 RNA RESP QL NAA+PROBE: NEGATIVE
SODIUM SERPL-SCNC: 139 MMOL/L (ref 136–145)
WBC # BLD AUTO: 20.2 10E3/UL (ref 4–11)

## 2023-08-07 PROCEDURE — 70491 CT SOFT TISSUE NECK W/DYE: CPT

## 2023-08-07 PROCEDURE — 250N000011 HC RX IP 250 OP 636: Performed by: EMERGENCY MEDICINE

## 2023-08-07 PROCEDURE — 83605 ASSAY OF LACTIC ACID: CPT | Performed by: EMERGENCY MEDICINE

## 2023-08-07 PROCEDURE — 36415 COLL VENOUS BLD VENIPUNCTURE: CPT | Performed by: EMERGENCY MEDICINE

## 2023-08-07 PROCEDURE — 250N000013 HC RX MED GY IP 250 OP 250 PS 637: Performed by: EMERGENCY MEDICINE

## 2023-08-07 PROCEDURE — 250N000009 HC RX 250: Performed by: EMERGENCY MEDICINE

## 2023-08-07 RX ORDER — DIPHENHYDRAMINE HYDROCHLORIDE AND LIDOCAINE HYDROCHLORIDE AND ALUMINUM HYDROXIDE AND MAGNESIUM HYDRO
10 KIT EVERY 6 HOURS PRN
Status: DISCONTINUED | OUTPATIENT
Start: 2023-08-07 | End: 2023-08-07 | Stop reason: HOSPADM

## 2023-08-07 RX ORDER — OXYCODONE HYDROCHLORIDE 5 MG/1
5 TABLET ORAL ONCE
Status: DISCONTINUED | OUTPATIENT
Start: 2023-08-07 | End: 2023-08-07

## 2023-08-07 RX ORDER — IOPAMIDOL 755 MG/ML
500 INJECTION, SOLUTION INTRAVASCULAR ONCE
Status: COMPLETED | OUTPATIENT
Start: 2023-08-07 | End: 2023-08-07

## 2023-08-07 RX ADMIN — IOPAMIDOL 100 ML: 755 INJECTION, SOLUTION INTRAVENOUS at 00:26

## 2023-08-07 RX ADMIN — DIPHENHYDRAMINE HYDROCHLORIDE AND LIDOCAINE HYDROCHLORIDE AND ALUMINUM HYDROXIDE AND MAGNESIUM HYDRO 10 ML: KIT at 01:45

## 2023-08-07 RX ADMIN — SODIUM CHLORIDE 65 ML: 9 INJECTION, SOLUTION INTRAVENOUS at 00:26

## 2023-08-07 ASSESSMENT — ACTIVITIES OF DAILY LIVING (ADL): ADLS_ACUITY_SCORE: 35

## 2023-08-07 NOTE — ED TRIAGE NOTES
Pt complaining of tonsillar pain and swelling which has been ongoing for a month. Tonight around 2100, felt tonsils have become so swollen she is unable to take deep breath. No oral swelling noted aside from bilateral tonsils. VSS in triage. Last ibuprofen 2200, last tylenol 1200. Pt has previously been given abx and prednisone for this issue which has not helped.

## 2023-08-07 NOTE — ED PROVIDER NOTES
"    History     Chief Complaint:  Pharyngitis    HPI   Belen Dunlap is a 22 year old female who presents with pharyngitis. She reports waking up and being unable to breathe, describing it as a \"choking\" sensation. She reports being in and out of Urgent Care for inflammation of her tonsils for the past few weeks. She has been tested for both strep throat and mono, admitting that she tested positive for strep throat 7/3/23.  She completed a course of antibiotics though has visited again 7/10, 7/23 and 8/2. She was started on prednisone 60mg daily on 8/2 for 3 days.  She reports the pain and inflammation getting some better and then got a lot worse again. She is experiencing hot flashes and chills, but denies fever, cough, chest pain, abdominal pain or other symptoms.      Independent Historian:    The patient provided the history noted above.    Review of External Notes:  I reviewed the office visit note from Urgent Care on 08/02/23.    Medications:    Tessalon  Lamictal  Protonix  Minipress  Effexor  Albuterol  Abilify  Seroquel    Past Medical History:    Suicidal ideation  Arthritis  Depression  KAMILA  Chronic tension headache    Past Surgical History:    [place] teeth extraction     Physical Exam   Patient Vitals for the past 24 hrs:   BP Temp Temp src Pulse Resp SpO2   08/07/23 0220 -- -- -- -- -- 99 %   08/07/23 0202 (!) 152/115 -- -- 79 -- 99 %   08/07/23 0142 (!) 152/71 -- -- 58 -- 96 %   08/07/23 0102 (!) 142/94 -- -- 83 -- 99 %   08/07/23 0042 (!) 140/93 -- -- 90 -- 99 %   08/07/23 0019 111/79 -- -- 81 -- 100 %   08/07/23 0000 -- -- -- -- -- 100 %   08/06/23 2359 119/73 -- -- 84 -- --   08/06/23 2247 121/79 97  F (36.1  C) Oral 92 20 98 %      Physical Exam  Nursing note and vitals reviewed.  Constitutional: Well nourished.   Eyes: Conjunctiva normal.  Pupils are equal, round, and reactive to light.   ENT: Nose normal. Mucous membranes pink and moist.  TM normal. Posterior oropharyngeal erythema tonsils " 2+; uvula midline. No trismus  Neck: Normal range of motion.  CVS: Normal rate, regular rhythm.  Normal heart sounds.   Pulmonary: Lungs clear to auscultation bilaterally. Slight inspiratory stridor  GI: Abdomen soft. Nontender, nondistended. No rigidity or guarding.    MSK: No calf tenderness or swelling.  Neuro: Alert. Follows simple commands.  Skin: Skin is warm and dry. No rash noted.   Psychiatric: Normal affect.       Emergency Department Course     Imaging:  Soft tissue neck CT w contrast   Final Result   IMPRESSION:    1.  Prominence of tonsillar pillars but no evidence of abscess formation consistent with pharyngitis.   2.  Reactive adenopathy.        Report per radiology    Laboratory:  Labs Ordered and Resulted from Time of ED Arrival to Time of ED Departure   CBC WITH PLATELETS AND DIFFERENTIAL - Abnormal       Result Value    WBC Count 20.2 (*)     RBC Count 4.24      Hemoglobin 12.9      Hematocrit 37.9      MCV 89      MCH 30.4      MCHC 34.0      RDW 12.1      Platelet Count 290      % Neutrophils 70      % Lymphocytes 21      % Monocytes 6      % Eosinophils 1      % Basophils 1      % Immature Granulocytes 1      NRBCs per 100 WBC 0      Absolute Neutrophils 14.2 (*)     Absolute Lymphocytes 4.2      Absolute Monocytes 1.2      Absolute Eosinophils 0.2      Absolute Basophils 0.1      Absolute Immature Granulocytes 0.3      Absolute NRBCs 0.0     LACTIC ACID WHOLE BLOOD - Abnormal    Lactic Acid 0.6 (*)    INFLUENZA A/B, RSV, & SARS-COV2 PCR - Normal    Influenza A PCR Negative      Influenza B PCR Negative      RSV PCR Negative      SARS CoV2 PCR Negative     BASIC METABOLIC PANEL - Normal    Sodium 139      Potassium 3.8      Chloride 103      Carbon Dioxide (CO2) 23      Anion Gap 13      Urea Nitrogen 15.1      Creatinine 0.69      Calcium 9.3      Glucose 88      GFR Estimate >90     MONONUCLEOSIS SCREEN - Normal    Mononucleosis Screen Negative     HCG QUALITATIVE PREGNANCY - Normal    hCG  Serum Qualitative Negative     GROUP A STREPTOCOCCUS PCR THROAT SWAB - Normal    Group A strep by PCR Not Detected     RBC AND PLATELET MORPHOLOGY    Platelet Assessment        Value: Automated Count Confirmed. Platelet morphology is normal.    RBC Morphology Confirmed RBC Indices        Emergency Department Course & Assessments:       Interventions:  Medications   magic mouthwash suspension (diphenhydramine, lidocaine, aluminum-magnesium & simethicone) (10 mLs Swish & Swallow $Given 8/7/23 0145)   acetaminophen (TYLENOL) tablet 650 mg (650 mg Oral $Given 8/6/23 2302)   dexamethasone PF (DECADRON) injection 10 mg (10 mg Intravenous $Given 8/6/23 2341)   ketorolac (TORADOL) injection 15 mg (15 mg Intravenous $Given 8/6/23 2341)   0.9% sodium chloride BOLUS (0 mLs Intravenous Stopped 8/7/23 0048)   racEPINEPHrine neb solution 0.5 mL (0.5 mLs Nebulization $Given 8/6/23 2345)   iopamidol (ISOVUE-370) solution 500 mL (100 mLs Intravenous $Given 8/7/23 0026)   for CT scan flush use (65 mLs Intravenous $Given 8/7/23 0026)     Assessments:  2326 I obtained history and examined the patient as noted above.   0041 I rechecked and updated the patient.   0130 I rechecked and updated the patient. I deemed the patient safe to discharge home.    Independent Interpretation (X-rays, CTs, rhythm strip):  None    Consultations/Discussion of Management or Tests:  None       Social Determinants of Health affecting care:  Stress/Adjustment Disorders     Disposition:  The patient was discharged to home.     Impression & Plan    CMS Diagnoses: None    Medical Decision Making:  Patient is a 22-year-old female presenting with predominant complaints of pharyngitis.  Patient is overall nontoxic-appearing on arrival though was noted to have concerns for inspiratory stridor on arrival to the ED.  She was given a racemic epinephrine nebulizer treatment which seemed to improve her work of breathing overall.  Labs with noted leukocytosis though  lactate reassuring.  Possibly reactive given her recent steroid use though we cannot exclude infectious etiology.  She is recently been on amoxicillin for management of strep pharyngitis though strep negative today. COVID/influenza/RSV and mono negative today as well. She was given a dose of IV Decadron as well as Magic mouthwash and Toradol for symptom improvement.  Decision was made to pursue formal CT which fortunately is without evidence of peritonsillar abscess, retropharyngeal abscess or epiglottitis though does show prominence of the tonsillar pillars consistent with pharyngitis.  Reactive adenopathy identified as well.  Patient reported significant symptom improvement during her time in the ED.  She did not have any rebound or decompensation of symptoms requiring repeat racemic epinephrine neb.  She has albuterol at home.  At this point in time I do recommend close ENT follow-up for further evaluation though will empirically cover patient with Augmentin given her presentation.  I discussed stronger suspicion for more viral etiology though given the persistence of her symptoms will trial augmentin.  I do not feel a further steroid course is warranted at this point in time.  Magic mouthwash to be provided on discharge as well.  Monitor for lethargy, increased work of breathing, protracted vomiting or should symptoms worsen or change prior.  Present to the ED for further evaluation.  Patient comfortable plan of care.    Diagnosis:    ICD-10-CM    1. Pharyngitis, unspecified etiology  J02.9            Discharge Medications:  Discharge Medication List as of 8/7/2023  2:24 AM        START taking these medications    Details   magic mouthwash suspension (diphenhydrAMINE, lidocaine, aluminum-magnesium & simethicone) Swish and swallow 10 mLs in mouth every 6 hours as needed for mouth sores, Disp-100 mL, R-0, Local Print            Scribe Disclosure:  Yusef DAVALOS, am serving as a scribe at 11:42 PM on 8/6/2023 to  document services personally performed by Haley Franco DO based on my observations and the provider's statements to me.               Haley Franco,   08/07/23 0247

## 2023-08-28 ENCOUNTER — APPOINTMENT (OUTPATIENT)
Dept: CT IMAGING | Facility: CLINIC | Age: 23
End: 2023-08-28
Attending: STUDENT IN AN ORGANIZED HEALTH CARE EDUCATION/TRAINING PROGRAM
Payer: COMMERCIAL

## 2023-08-28 ENCOUNTER — APPOINTMENT (OUTPATIENT)
Dept: ULTRASOUND IMAGING | Facility: CLINIC | Age: 23
End: 2023-08-28
Attending: STUDENT IN AN ORGANIZED HEALTH CARE EDUCATION/TRAINING PROGRAM
Payer: COMMERCIAL

## 2023-08-28 ENCOUNTER — HOSPITAL ENCOUNTER (EMERGENCY)
Facility: CLINIC | Age: 23
Discharge: HOME | End: 2023-08-29
Attending: STUDENT IN AN ORGANIZED HEALTH CARE EDUCATION/TRAINING PROGRAM | Admitting: STUDENT IN AN ORGANIZED HEALTH CARE EDUCATION/TRAINING PROGRAM
Payer: COMMERCIAL

## 2023-08-28 DIAGNOSIS — N20.1 LEFT URETERAL STONE: ICD-10-CM

## 2023-08-28 LAB
ANION GAP SERPL CALCULATED.3IONS-SCNC: 16 MMOL/L (ref 7–15)
BASOPHILS # BLD AUTO: 0.1 10E3/UL (ref 0–0.2)
BASOPHILS NFR BLD AUTO: 1 %
BUN SERPL-MCNC: 11.9 MG/DL (ref 6–20)
CALCIUM SERPL-MCNC: 9.5 MG/DL (ref 8.6–10)
CHLORIDE SERPL-SCNC: 104 MMOL/L (ref 98–107)
CREAT SERPL-MCNC: 0.87 MG/DL (ref 0.51–0.95)
DEPRECATED HCO3 PLAS-SCNC: 19 MMOL/L (ref 22–29)
EOSINOPHIL # BLD AUTO: 0.2 10E3/UL (ref 0–0.7)
EOSINOPHIL NFR BLD AUTO: 2 %
ERYTHROCYTE [DISTWIDTH] IN BLOOD BY AUTOMATED COUNT: 13.3 % (ref 10–15)
GFR SERPL CREATININE-BSD FRML MDRD: >90 ML/MIN/1.73M2
GLUCOSE SERPL-MCNC: 106 MG/DL (ref 70–99)
HCG SERPL QL: NEGATIVE
HCT VFR BLD AUTO: 38 % (ref 35–47)
HGB BLD-MCNC: 12.9 G/DL (ref 11.7–15.7)
HOLD SPECIMEN: NORMAL
HOLD SPECIMEN: NORMAL
IMM GRANULOCYTES # BLD: 0 10E3/UL
IMM GRANULOCYTES NFR BLD: 0 %
LYMPHOCYTES # BLD AUTO: 3.7 10E3/UL (ref 0.8–5.3)
LYMPHOCYTES NFR BLD AUTO: 37 %
MCH RBC QN AUTO: 30.8 PG (ref 26.5–33)
MCHC RBC AUTO-ENTMCNC: 33.9 G/DL (ref 31.5–36.5)
MCV RBC AUTO: 91 FL (ref 78–100)
MONOCYTES # BLD AUTO: 0.8 10E3/UL (ref 0–1.3)
MONOCYTES NFR BLD AUTO: 7 %
NEUTROPHILS # BLD AUTO: 5.3 10E3/UL (ref 1.6–8.3)
NEUTROPHILS NFR BLD AUTO: 53 %
NRBC # BLD AUTO: 0 10E3/UL
NRBC BLD AUTO-RTO: 0 /100
PLATELET # BLD AUTO: 273 10E3/UL (ref 150–450)
POTASSIUM SERPL-SCNC: 3.4 MMOL/L (ref 3.4–5.3)
RBC # BLD AUTO: 4.19 10E6/UL (ref 3.8–5.2)
SODIUM SERPL-SCNC: 139 MMOL/L (ref 136–145)
WBC # BLD AUTO: 10.1 10E3/UL (ref 4–11)

## 2023-08-28 PROCEDURE — 85025 COMPLETE CBC W/AUTO DIFF WBC: CPT | Performed by: STUDENT IN AN ORGANIZED HEALTH CARE EDUCATION/TRAINING PROGRAM

## 2023-08-28 PROCEDURE — 80048 BASIC METABOLIC PNL TOTAL CA: CPT | Performed by: EMERGENCY MEDICINE

## 2023-08-28 PROCEDURE — 85025 COMPLETE CBC W/AUTO DIFF WBC: CPT | Performed by: EMERGENCY MEDICINE

## 2023-08-28 PROCEDURE — 96375 TX/PRO/DX INJ NEW DRUG ADDON: CPT

## 2023-08-28 PROCEDURE — 96374 THER/PROPH/DIAG INJ IV PUSH: CPT

## 2023-08-28 PROCEDURE — 36415 COLL VENOUS BLD VENIPUNCTURE: CPT | Performed by: EMERGENCY MEDICINE

## 2023-08-28 PROCEDURE — 81003 URINALYSIS AUTO W/O SCOPE: CPT | Performed by: STUDENT IN AN ORGANIZED HEALTH CARE EDUCATION/TRAINING PROGRAM

## 2023-08-28 PROCEDURE — 84703 CHORIONIC GONADOTROPIN ASSAY: CPT | Performed by: STUDENT IN AN ORGANIZED HEALTH CARE EDUCATION/TRAINING PROGRAM

## 2023-08-28 PROCEDURE — 76830 TRANSVAGINAL US NON-OB: CPT

## 2023-08-28 PROCEDURE — 74176 CT ABD & PELVIS W/O CONTRAST: CPT

## 2023-08-28 PROCEDURE — 80048 BASIC METABOLIC PNL TOTAL CA: CPT | Performed by: STUDENT IN AN ORGANIZED HEALTH CARE EDUCATION/TRAINING PROGRAM

## 2023-08-28 PROCEDURE — 99285 EMERGENCY DEPT VISIT HI MDM: CPT | Mod: 25

## 2023-08-28 PROCEDURE — 250N000011 HC RX IP 250 OP 636: Performed by: STUDENT IN AN ORGANIZED HEALTH CARE EDUCATION/TRAINING PROGRAM

## 2023-08-28 PROCEDURE — 96361 HYDRATE IV INFUSION ADD-ON: CPT

## 2023-08-28 PROCEDURE — 96376 TX/PRO/DX INJ SAME DRUG ADON: CPT

## 2023-08-28 PROCEDURE — 258N000003 HC RX IP 258 OP 636: Performed by: STUDENT IN AN ORGANIZED HEALTH CARE EDUCATION/TRAINING PROGRAM

## 2023-08-28 RX ORDER — KETOROLAC TROMETHAMINE 15 MG/ML
10 INJECTION, SOLUTION INTRAMUSCULAR; INTRAVENOUS ONCE
Status: COMPLETED | OUTPATIENT
Start: 2023-08-28 | End: 2023-08-28

## 2023-08-28 RX ORDER — HYDROMORPHONE HYDROCHLORIDE 1 MG/ML
0.5 INJECTION, SOLUTION INTRAMUSCULAR; INTRAVENOUS; SUBCUTANEOUS
Status: COMPLETED | OUTPATIENT
Start: 2023-08-28 | End: 2023-08-28

## 2023-08-28 RX ORDER — ONDANSETRON 2 MG/ML
4 INJECTION INTRAMUSCULAR; INTRAVENOUS ONCE
Status: COMPLETED | OUTPATIENT
Start: 2023-08-28 | End: 2023-08-28

## 2023-08-28 RX ADMIN — KETOROLAC TROMETHAMINE 10 MG: 15 INJECTION, SOLUTION INTRAMUSCULAR; INTRAVENOUS at 23:18

## 2023-08-28 RX ADMIN — HYDROMORPHONE HYDROCHLORIDE 0.5 MG: 1 INJECTION, SOLUTION INTRAMUSCULAR; INTRAVENOUS; SUBCUTANEOUS at 20:57

## 2023-08-28 RX ADMIN — SODIUM CHLORIDE 1000 ML: 9 INJECTION, SOLUTION INTRAVENOUS at 23:18

## 2023-08-28 RX ADMIN — HYDROMORPHONE HYDROCHLORIDE 0.5 MG: 1 INJECTION, SOLUTION INTRAMUSCULAR; INTRAVENOUS; SUBCUTANEOUS at 19:48

## 2023-08-28 RX ADMIN — ONDANSETRON 4 MG: 2 INJECTION INTRAMUSCULAR; INTRAVENOUS at 19:05

## 2023-08-28 RX ADMIN — KETOROLAC TROMETHAMINE 10 MG: 15 INJECTION, SOLUTION INTRAMUSCULAR; INTRAVENOUS at 19:26

## 2023-08-28 ASSESSMENT — ACTIVITIES OF DAILY LIVING (ADL)
ADLS_ACUITY_SCORE: 35
ADLS_ACUITY_SCORE: 35
ADLS_ACUITY_SCORE: 33

## 2023-08-29 VITALS
HEART RATE: 77 BPM | OXYGEN SATURATION: 100 % | SYSTOLIC BLOOD PRESSURE: 127 MMHG | RESPIRATION RATE: 16 BRPM | TEMPERATURE: 97.5 F | DIASTOLIC BLOOD PRESSURE: 82 MMHG

## 2023-08-29 LAB
ALBUMIN UR-MCNC: 20 MG/DL
APPEARANCE UR: CLEAR
BILIRUB UR QL STRIP: NEGATIVE
COLOR UR AUTO: YELLOW
GLUCOSE UR STRIP-MCNC: NEGATIVE MG/DL
HGB UR QL STRIP: ABNORMAL
KETONES UR STRIP-MCNC: 10 MG/DL
LEUKOCYTE ESTERASE UR QL STRIP: NEGATIVE
MUCOUS THREADS #/AREA URNS LPF: PRESENT /LPF
NITRATE UR QL: NEGATIVE
PH UR STRIP: 6.5 [PH] (ref 5–7)
RBC URINE: 1 /HPF
SP GR UR STRIP: 1.03 (ref 1–1.03)
SQUAMOUS EPITHELIAL: 2 /HPF
UROBILINOGEN UR STRIP-MCNC: NORMAL MG/DL
WBC URINE: 2 /HPF

## 2023-08-29 PROCEDURE — 250N000011 HC RX IP 250 OP 636: Performed by: STUDENT IN AN ORGANIZED HEALTH CARE EDUCATION/TRAINING PROGRAM

## 2023-08-29 RX ORDER — HYDROMORPHONE HYDROCHLORIDE 1 MG/ML
0.5 INJECTION, SOLUTION INTRAMUSCULAR; INTRAVENOUS; SUBCUTANEOUS
Status: COMPLETED | OUTPATIENT
Start: 2023-08-29 | End: 2023-08-29

## 2023-08-29 RX ORDER — TAMSULOSIN HYDROCHLORIDE 0.4 MG/1
0.4 CAPSULE ORAL DAILY
Qty: 7 CAPSULE | Refills: 0 | Status: SHIPPED | OUTPATIENT
Start: 2023-08-29 | End: 2023-09-05

## 2023-08-29 RX ORDER — IBUPROFEN 800 MG/1
800 TABLET, FILM COATED ORAL EVERY 8 HOURS PRN
Qty: 30 TABLET | Refills: 0 | Status: SHIPPED | OUTPATIENT
Start: 2023-08-29 | End: 2023-09-08

## 2023-08-29 RX ORDER — OXYCODONE HYDROCHLORIDE 5 MG/1
5 TABLET ORAL EVERY 6 HOURS PRN
Qty: 10 TABLET | Refills: 0 | Status: SHIPPED | OUTPATIENT
Start: 2023-08-29 | End: 2023-09-01

## 2023-08-29 RX ORDER — ONDANSETRON 4 MG/1
4 TABLET, ORALLY DISINTEGRATING ORAL EVERY 8 HOURS PRN
Qty: 10 TABLET | Refills: 0 | Status: SHIPPED | OUTPATIENT
Start: 2023-08-29 | End: 2023-09-01

## 2023-08-29 RX ADMIN — HYDROMORPHONE HYDROCHLORIDE 0.5 MG: 1 INJECTION, SOLUTION INTRAMUSCULAR; INTRAVENOUS; SUBCUTANEOUS at 00:22

## 2023-08-29 NOTE — ED PROVIDER NOTES
History     Chief Complaint:  Abdominal Pain       The history is provided by the patient.      Belen Dunlap is a 22 year old female who presents with left sided abdominal pain. 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 She states she has never had this type of pain in the past, and nothing makes it better or worse. She has been vomiting, and her last bowel movement was about 1 hour ago (after pain began) and normal. She is unsure when she last ate. Patient does not think there is a chance for pregnancy and notes she is currently on her period. Denies any fever, dysuria, bloody stool, or diarrhea. No history of kidney stones or ovarian cyst. Also denies medical issues, allergies, or history of abdominal surgery.     Patient denies history of narcotic addiction or abuse.  She notes that her mother is a pharmacist.    Independent Historian:   None - Patient Only    Review of External Notes:   PDMP with no narcotic prescriptions.     Medications:    Albuterol  Aripiprazole  Nortrel  Quetiapine   Lamotrigine  Pantoprazole  Prazosin  Venlafaxine    Past Medical History:    Anxiety   Depression   Arthritis   Chronic tension type headache  Dysmenorrhea  Contusion of knee  Self-harm    Past Surgical History:    [place] teeth extraction     Physical Exam   Patient Vitals for the past 24 hrs:   BP Temp Temp src Pulse Resp SpO2   08/28/23 2100 (!) 125/94 -- -- 82 16 97 %   08/28/23 1954 -- -- -- 72 18 100 %   08/28/23 1733 (!) 136/94 97.5  F (36.4  C) Temporal 68 20 100 %      Physical Exam  Vitals: Reviewed, as above.    General: Alert and oriented, in moderate distress.  Writhing on the bed.    Skin: Warm and well-perfused. No rashes, lesions, or erythema.   HEENT:   Head: Normocephalic, atraumatic. Facial features symmetric.   Eyes: Conjunctiva pink, sclera white. EOMs grossly  intact.   Ears: Auricles without lesion, erythema, or edema.   Nose: Symmetric with no discharge.  Mouth and throat: Lips are moist. Buccal mucosa is pink and moist without lesions. Oropharyngeal mucosa is pink and moist with no erythema, edema, or exudate. Uvula is midline.  Neck: Supple with no lymphadenopathy. Full ROM.   Pulmonary: Chest wall expansion symmetric with no increased work of breathing. Lungs clear to auscultation bilaterally.   Cardiovascular: Heart RRR with no murmurs.   Abdominal: No hernias or distension.  Left CVA tenderness.  Bowel sounds present and physiologic. Abdomen is soft.  Tenderness palpation in the left lower quadrant with no rebound tenderness. No masses or organomegaly.   Musculoskeletal: Moves all extremities spontaneously.  Psych: Affect appropriate.  Answers questions appropriately. Patient appears calm.    Emergency Department Course   Imaging:  CT Abdomen Pelvis w/o Contrast   Final Result   IMPRESSION:    1.  2 to 3 mm obstructing calculus at the left UVJ.         US Pelvis Cmplt w Transvag & Doppler LmtPel Duplex Limited   Final Result   IMPRESSION:     1.  Negative pelvic ultrasound.                  Report per radiology    Laboratory:  Labs Ordered and Resulted from Time of ED Arrival to Time of ED Departure   BASIC METABOLIC PANEL - Abnormal       Result Value    Sodium 139      Potassium 3.4      Chloride 104      Carbon Dioxide (CO2) 19 (*)     Anion Gap 16 (*)     Urea Nitrogen 11.9      Creatinine 0.87      Calcium 9.5      Glucose 106 (*)     GFR Estimate >90     ROUTINE UA WITH MICROSCOPIC REFLEX TO CULTURE - Abnormal    Color Urine Yellow      Appearance Urine Clear      Glucose Urine Negative      Bilirubin Urine Negative      Ketones Urine 10 (*)     Specific Gravity Urine 1.026      Blood Urine Small (*)     pH Urine 6.5      Protein Albumin Urine 20 (*)     Urobilinogen Urine Normal      Nitrite Urine Negative      Leukocyte Esterase Urine Negative      Mucus  Urine Present (*)     RBC Urine 1      WBC Urine 2      Squamous Epithelials Urine 2 (*)    HCG QUALITATIVE PREGNANCY - Normal    hCG Serum Qualitative Negative     CBC WITH PLATELETS AND DIFFERENTIAL    WBC Count 10.1      RBC Count 4.19      Hemoglobin 12.9      Hematocrit 38.0      MCV 91      MCH 30.8      MCHC 33.9      RDW 13.3      Platelet Count 273      % Neutrophils 53      % Lymphocytes 37      % Monocytes 7      % Eosinophils 2      % Basophils 1      % Immature Granulocytes 0      NRBCs per 100 WBC 0      Absolute Neutrophils 5.3      Absolute Lymphocytes 3.7      Absolute Monocytes 0.8      Absolute Eosinophils 0.2      Absolute Basophils 0.1      Absolute Immature Granulocytes 0.0      Absolute NRBCs 0.0        Emergency Department Course & Assessments:  Interventions:  Medications   ondansetron (ZOFRAN) injection 4 mg (4 mg Intravenous $Given 8/28/23 1905)   ketorolac (TORADOL) injection 10 mg (10 mg Intravenous $Given 8/28/23 1926)   HYDROmorphone (PF) (DILAUDID) injection 0.5 mg (0.5 mg Intravenous $Given 8/28/23 1948)   HYDROmorphone (PF) (DILAUDID) injection 0.5 mg (0.5 mg Intravenous $Given 8/28/23 2057)   0.9% sodium chloride BOLUS (0 mLs Intravenous Stopped 8/29/23 0023)   ketorolac (TORADOL) injection 10 mg (10 mg Intravenous $Given 8/28/23 2318)   HYDROmorphone (PF) (DILAUDID) injection 0.5 mg (0.5 mg Intravenous $Given 8/29/23 0022)      Independent Interpretation (X-rays, CTs, rhythm strip):  None    Assessments/Consultations/Discussion of Management or Tests:   ED Course as of 08/29/23 0032   Mon Aug 28, 2023   1915 I evaluated the patient and obtained history.   2037 Rechecked. Patient was kissing her boyfriend. Reported pain was improved, but beginning to return following ultrasound.    2137 Rechecked.  I repeated the abdominal exam.  Abdominal tenderness had improved.  No rebound tenderness.  Patient had left CVA tenderness. She was drinking water.    2342 I rechecked the patient and  explained findings.  She reported improvement in her pain to a 7/10.  She had been unable to supply a urine sample.  She was going to try right now.   Tue Aug 29, 2023   0018 I rechecked the patient.  She reported recurrence of her pain and was holding the left side of her abdomen.   0027 I rechecked the patient.  She had improvement in her pain and is again highly motivated to return home.  Return precautions discussed.  She is comfortable with discharge plan.     Social Determinants of Health affecting care:   None    Disposition:  The patient was discharged to home.     Impression & Plan    CMS Diagnoses: None    Medical Decision Making:  Belen Dunlap is a 22 year old female who presents with left sided abdominal pain.  Please see HPI and exam for details.  Differential includes ovarian pathology, pyelonephritis, ureteral stone, diverticulitis, gallbladder pathology, ectopic pregnancy, among others.  Patient was in significant pain on my initial evaluation, and I was concerned for possible active ovarian torsion.  Ultrasound of the pelvis was obtained, and was within normal limits with normal-sized ovaries and no free fluid.  Pregnancy test is negative.  Her pain improved significantly following Toradol and Dilaudid.  Kidney function is within normal limits, and urinalysis is not concerning for infection.  This does reveal a small amount of blood.  I had a shared decision-making conversation with the patient regarding CT scanning, and she is amenable to proceed, out of concern for ureteral stone.  CT does confirm 2 to 3 mm left UVJ stone with no other acute abnormalities.  Patient is highly motivated to return home, and I provided her with the medications for discharge, as noted below.  We discussed safe use of narcotic medication.  Patient again reiterates that her mother is a pharmacist, and she will ensure safe disposal of any leftover pain medication to minimize the risk of diversion (total prescribed  was 10).  Return precautions were discussed in detail to the emergency department.  She will otherwise follow-up with urology.  She is comfortable with the plan.    Critical Care time:  was 0 minutes for this patient excluding procedures.    Diagnosis:    ICD-10-CM    1. Left ureteral stone  N20.1            Discharge Medications:  New Prescriptions    IBUPROFEN (ADVIL/MOTRIN) 800 MG TABLET    Take 1 tablet (800 mg) by mouth every 8 hours as needed for moderate pain    ONDANSETRON (ZOFRAN ODT) 4 MG ODT TAB    Take 1 tablet (4 mg) by mouth every 8 hours as needed for nausea    OXYCODONE (ROXICODONE) 5 MG TABLET    Take 1 tablet (5 mg) by mouth every 6 hours as needed for severe pain    TAMSULOSIN (FLOMAX) 0.4 MG CAPSULE    Take 1 capsule (0.4 mg) by mouth daily for 7 days      Scribe Disclosure:  Chantel DAVALOS, am serving as a scribe at 7:44 PM on 8/28/2023 to document services personally performed by Vivian Dee PA-C based on my observations and the provider's statements to me.    8/28/2023   Vivian Dee PA-C Sells, Jenna, PA-C  08/29/23 0033

## 2023-10-08 ENCOUNTER — HEALTH MAINTENANCE LETTER (OUTPATIENT)
Age: 23
End: 2023-10-08

## 2023-10-09 ENCOUNTER — OFFICE VISIT (OUTPATIENT)
Dept: INTERNAL MEDICINE | Facility: CLINIC | Age: 23
End: 2023-10-09
Payer: COMMERCIAL

## 2023-10-09 VITALS
HEIGHT: 64 IN | OXYGEN SATURATION: 97 % | DIASTOLIC BLOOD PRESSURE: 72 MMHG | HEART RATE: 88 BPM | RESPIRATION RATE: 16 BRPM | TEMPERATURE: 98.1 F | SYSTOLIC BLOOD PRESSURE: 108 MMHG | BODY MASS INDEX: 31.24 KG/M2 | WEIGHT: 183 LBS

## 2023-10-09 DIAGNOSIS — Z01.818 PRE-OP EXAM: Primary | ICD-10-CM

## 2023-10-09 DIAGNOSIS — R30.0 DYSURIA: ICD-10-CM

## 2023-10-09 DIAGNOSIS — R06.02 SHORTNESS OF BREATH: ICD-10-CM

## 2023-10-09 DIAGNOSIS — J03.01 ACUTE RECURRENT STREPTOCOCCAL TONSILLITIS: ICD-10-CM

## 2023-10-09 LAB
ALBUMIN UR-MCNC: 30 MG/DL
ANION GAP SERPL CALCULATED.3IONS-SCNC: 14 MMOL/L (ref 7–15)
APPEARANCE UR: ABNORMAL
BACTERIA #/AREA URNS HPF: ABNORMAL /HPF
BILIRUB UR QL STRIP: NEGATIVE
BUN SERPL-MCNC: 12.8 MG/DL (ref 6–20)
CALCIUM SERPL-MCNC: 9.3 MG/DL (ref 8.6–10)
CHLORIDE SERPL-SCNC: 107 MMOL/L (ref 98–107)
COLOR UR AUTO: ABNORMAL
CREAT SERPL-MCNC: 0.7 MG/DL (ref 0.51–0.95)
DEPRECATED HCO3 PLAS-SCNC: 19 MMOL/L (ref 22–29)
EGFRCR SERPLBLD CKD-EPI 2021: >90 ML/MIN/1.73M2
ERYTHROCYTE [DISTWIDTH] IN BLOOD BY AUTOMATED COUNT: 12.3 % (ref 10–15)
GLUCOSE SERPL-MCNC: 85 MG/DL (ref 70–99)
GLUCOSE UR STRIP-MCNC: 100 MG/DL
HCG INTACT+B SERPL-ACNC: <1 MIU/ML
HCT VFR BLD AUTO: 40.4 % (ref 35–47)
HGB BLD-MCNC: 14.2 G/DL (ref 11.7–15.7)
HGB UR QL STRIP: NEGATIVE
KETONES UR STRIP-MCNC: NEGATIVE MG/DL
LEUKOCYTE ESTERASE UR QL STRIP: ABNORMAL
MCH RBC QN AUTO: 31.6 PG (ref 26.5–33)
MCHC RBC AUTO-ENTMCNC: 35.1 G/DL (ref 31.5–36.5)
MCV RBC AUTO: 90 FL (ref 78–100)
MUCOUS THREADS #/AREA URNS LPF: PRESENT /LPF
NITRATE UR QL: POSITIVE
PH UR STRIP: 5.5 [PH] (ref 5–7)
PLATELET # BLD AUTO: 210 10E3/UL (ref 150–450)
POTASSIUM SERPL-SCNC: 4.2 MMOL/L (ref 3.4–5.3)
RBC # BLD AUTO: 4.49 10E6/UL (ref 3.8–5.2)
RBC #/AREA URNS AUTO: ABNORMAL /HPF
SODIUM SERPL-SCNC: 140 MMOL/L (ref 135–145)
SP GR UR STRIP: >=1.03 (ref 1–1.03)
SQUAMOUS #/AREA URNS AUTO: ABNORMAL /LPF
UROBILINOGEN UR STRIP-ACNC: 1 E.U./DL
WBC # BLD AUTO: 7.7 10E3/UL (ref 4–11)
WBC #/AREA URNS AUTO: ABNORMAL /HPF

## 2023-10-09 PROCEDURE — 87086 URINE CULTURE/COLONY COUNT: CPT

## 2023-10-09 PROCEDURE — 80048 BASIC METABOLIC PNL TOTAL CA: CPT

## 2023-10-09 PROCEDURE — 81001 URINALYSIS AUTO W/SCOPE: CPT

## 2023-10-09 PROCEDURE — 36415 COLL VENOUS BLD VENIPUNCTURE: CPT

## 2023-10-09 PROCEDURE — 99214 OFFICE O/P EST MOD 30 MIN: CPT

## 2023-10-09 PROCEDURE — 84702 CHORIONIC GONADOTROPIN TEST: CPT

## 2023-10-09 PROCEDURE — 85027 COMPLETE CBC AUTOMATED: CPT

## 2023-10-09 NOTE — PROGRESS NOTES
[place] [address]  [address]  [facility] [address]  Phone: [phone]  Primary Provider: No Ref-Primary, Physician  Pre-op Performing Provider: TABITHA TREJO      PREOPERATIVE EVALUATION:  Today's date: 10/9/2023    Belen is a 22 year old female who presents for a preoperative evaluation.      Surgical Information:  Surgery/Procedure: Tonsillectomy   Surgery Location: [facility]   Surgeon: Dr. Hartley   Surgery Date: 10/11/23  Time of Surgery: TBD  Where patient plans to recover: At home with family  Fax number for surgical facility: [phone] and [phone]    Assessment & Plan     The proposed surgical procedure is considered INTERMEDIATE risk.    (Z01.818) Pre-op exam  (primary encounter diagnosis)  Comment: Okay to proceed with procedure as planned  Plan: HCG quantitative pregnancy, Basic metabolic         panel  (Ca, Cl, CO2, Creat, Gluc, K, Na, BUN),         CBC with platelets            (J03.01) Acute recurrent streptococcal tonsillitis  Comment: Pt with history of recurrent tonsillitis- plan for tonsillectomy.   Plan:     (R06.02) Shortness of breath  Comment: When she has URI, she has moderate SOB and coughing and requires use of albuterol inhaler. No hx of asthma diagnosis.   Plan:     (R30.0) Dysuria  Comment: Over past 1-2 days pt c/o dysuria. She denies any fevers. Complains of pain in pelvic region. She has taken Azo OTC- we discussed this can alter laboratory results for UA. Pt is aware. Will still obtain UA to see if this can help rule in or rule out UTI  Plan: UA Macroscopic with reflex to Microscopic and         Culture - Lab Collect, Urine Microscopic Exam,         Urine Culture             - No identified additional risk factors other than previously addressed    Antiplatelet or Anticoagulation Medication Instructions:   - Patient is on no antiplatelet or anticoagulation medications.    Additional Medication Instructions:  Patient is on no  additional chronic medications    RECOMMENDATION:  APPROVAL GIVEN to proceed with proposed procedure, without further diagnostic evaluation.            Subjective       HPI related to upcoming procedure: Pt with history of recurrent tonsillitis. Plan for tonsillectomy on 10/11/23.         10/2/2023    11:00 AM   Preop Questions   1. Have you ever had a heart attack or stroke? No   2. Have you ever had surgery on your heart or blood vessels, such as a stent placement, a coronary artery bypass, or surgery on an artery in your head, neck, heart, or legs? No   3. Do you have chest pain with activity? No   4. Do you have a history of  heart failure? No   5. Do you currently have a cold, bronchitis or symptoms of other infection? No   6. Do you have a cough, shortness of breath, or wheezing? No   7. Do you or anyone in your family have previous history of blood clots? No   8. Do you or does anyone in your family have a serious bleeding problem such as prolonged bleeding following surgeries or cuts? No   9. Have you ever had problems with anemia or been told to take iron pills? No   10. Have you had any abnormal blood loss such as black, tarry or bloody stools, or abnormal vaginal bleeding? No   11. Have you ever had a blood transfusion? No   12. Are you willing to have a blood transfusion if it is medically needed before, during, or after your surgery? Yes   13. Have you or any of your relatives ever had problems with anesthesia? No   14. Do you have sleep apnea, excessive snoring or daytime drowsiness? No   15. Do you have any artifical heart valves or other implanted medical devices like a pacemaker, defibrillator, or continuous glucose monitor? No   16. Do you have artificial joints? No   17. Are you allergic to latex? No   18. Is there any chance that you may be pregnant? No- last menses was 3 weeks ago       Health Care Directive:  Patient does not have a Health Care Directive or Living Will: Discussed advance care  "planning with patient; however, patient declined at this time.    Preoperative Review of :   reviewed - no record of controlled substances prescribed.          Review of Systems  CONSTITUTIONAL: NEGATIVE for fever, chills, change in weight  ENT/MOUTH: NEGATIVE for ear, mouth and throat problems  RESP: NEGATIVE for significant cough or SOB  CV: NEGATIVE for chest pain, palpitations or peripheral edema    Patient Active Problem List    Diagnosis Date Noted    Suicidal ideation 07/26/2020     Priority: Medium      Past Medical History:   Diagnosis Date    Anxiety     Depression     Depressive disorder      Past Surgical History:   Procedure Laterality Date    [place] teeth extraction        Current Outpatient Medications   Medication Sig Dispense Refill    albuterol (PROAIR HFA/PROVENTIL HFA/VENTOLIN HFA) 108 (90 Base) MCG/ACT inhaler Inhale 2 puffs into the lungs every 6 hours as needed for shortness of breath / dyspnea or wheezing         No Known Allergies     Social History     Tobacco Use    Smoking status: Never    Smokeless tobacco: Never   Substance Use Topics    Alcohol use: Yes     Alcohol/week: 4.0 standard drinks of alcohol     Types: 4 Shots of liquor per week       History   Drug Use    Types: Marijuana         Objective     /72   Pulse 88   Temp 98.1  F (36.7  C) (Oral)   Resp 16   Ht 1.632 m (5' 4.25\")   Wt 83 kg (183 lb)   LMP 08/28/2023   SpO2 97%   BMI 31.17 kg/m        Physical Exam  Constitutional:       General: She is not in acute distress.     Appearance: Normal appearance. She is not ill-appearing, toxic-appearing or diaphoretic.   HENT:      Head: Normocephalic and atraumatic.   Eyes:      Conjunctiva/sclera: Conjunctivae normal.   Cardiovascular:      Rate and Rhythm: Normal rate and regular rhythm.      Heart sounds: Normal heart sounds.   Pulmonary:      Effort: Pulmonary effort is normal.      Breath sounds: Normal breath sounds.   Skin:     General: Skin is warm and " dry.   Neurological:      Mental Status: She is alert and oriented to person, place, and time.   Psychiatric:         Mood and Affect: Mood normal.         Behavior: Behavior normal.         Thought Content: Thought content normal.         Judgment: Judgment normal.       Recent Labs   Lab Test 08/28/23  1752 08/06/23  2337   HGB 12.9 12.9    290    139   POTASSIUM 3.4 3.8   CR 0.87 0.69        Diagnostics:  Labs pending at this time.  Results will be reviewed when available.   No EKG required, no history of coronary heart disease, significant arrhythmia, peripheral arterial disease or other structural heart disease.    Revised Cardiac Risk Index (RCRI):  The patient has the following serious cardiovascular risks for perioperative complications:   - No serious cardiac risks = 0 points     RCRI Interpretation: 0 points: Class I (very low risk - 0.4% complication rate)         Signed Electronically by: JESUSITA Bills CNP  Copy of this evaluation report is provided to requesting physician.

## 2023-10-11 LAB — BACTERIA UR CULT: NORMAL

## 2023-10-12 ENCOUNTER — HOSPITAL ENCOUNTER (EMERGENCY)
Facility: CLINIC | Age: 23
Discharge: HOME | End: 2023-10-12
Attending: EMERGENCY MEDICINE | Admitting: EMERGENCY MEDICINE
Payer: COMMERCIAL

## 2023-10-12 VITALS
DIASTOLIC BLOOD PRESSURE: 70 MMHG | SYSTOLIC BLOOD PRESSURE: 131 MMHG | TEMPERATURE: 98.6 F | HEART RATE: 66 BPM | OXYGEN SATURATION: 100 % | RESPIRATION RATE: 16 BRPM

## 2023-10-12 DIAGNOSIS — G89.18 POST-TONSILLECTOMY PAIN: ICD-10-CM

## 2023-10-12 DIAGNOSIS — Z90.89 POST-TONSILLECTOMY PAIN: ICD-10-CM

## 2023-10-12 LAB
ANION GAP SERPL CALCULATED.3IONS-SCNC: 11 MMOL/L (ref 7–15)
BASO+EOS+MONOS # BLD AUTO: ABNORMAL 10*3/UL
BASO+EOS+MONOS NFR BLD AUTO: ABNORMAL %
BASOPHILS # BLD AUTO: 0 10E3/UL (ref 0–0.2)
BASOPHILS NFR BLD AUTO: 0 %
BUN SERPL-MCNC: 11.3 MG/DL (ref 6–20)
CALCIUM SERPL-MCNC: 9.5 MG/DL (ref 8.6–10)
CHLORIDE SERPL-SCNC: 101 MMOL/L (ref 98–107)
CREAT SERPL-MCNC: 0.63 MG/DL (ref 0.51–0.95)
DEPRECATED HCO3 PLAS-SCNC: 21 MMOL/L (ref 22–29)
EGFRCR SERPLBLD CKD-EPI 2021: >90 ML/MIN/1.73M2
EOSINOPHIL # BLD AUTO: 0 10E3/UL (ref 0–0.7)
EOSINOPHIL NFR BLD AUTO: 0 %
ERYTHROCYTE [DISTWIDTH] IN BLOOD BY AUTOMATED COUNT: 12 % (ref 10–15)
GLUCOSE SERPL-MCNC: 93 MG/DL (ref 70–99)
HCT VFR BLD AUTO: 37.9 % (ref 35–47)
HGB BLD-MCNC: 13.1 G/DL (ref 11.7–15.7)
IMM GRANULOCYTES # BLD: 0 10E3/UL
IMM GRANULOCYTES NFR BLD: 0 %
LYMPHOCYTES # BLD AUTO: 2.1 10E3/UL (ref 0.8–5.3)
LYMPHOCYTES NFR BLD AUTO: 17 %
MCH RBC QN AUTO: 31.5 PG (ref 26.5–33)
MCHC RBC AUTO-ENTMCNC: 34.6 G/DL (ref 31.5–36.5)
MCV RBC AUTO: 91 FL (ref 78–100)
MONOCYTES # BLD AUTO: 1.1 10E3/UL (ref 0–1.3)
MONOCYTES NFR BLD AUTO: 9 %
NEUTROPHILS # BLD AUTO: 9.5 10E3/UL (ref 1.6–8.3)
NEUTROPHILS NFR BLD AUTO: 74 %
NRBC # BLD AUTO: 0 10E3/UL
NRBC BLD AUTO-RTO: 0 /100
PLATELET # BLD AUTO: 218 10E3/UL (ref 150–450)
POTASSIUM SERPL-SCNC: 4 MMOL/L (ref 3.4–5.3)
RBC # BLD AUTO: 4.16 10E6/UL (ref 3.8–5.2)
SODIUM SERPL-SCNC: 133 MMOL/L (ref 135–145)
WBC # BLD AUTO: 12.8 10E3/UL (ref 4–11)

## 2023-10-12 PROCEDURE — 250N000013 HC RX MED GY IP 250 OP 250 PS 637: Performed by: EMERGENCY MEDICINE

## 2023-10-12 PROCEDURE — 258N000003 HC RX IP 258 OP 636: Performed by: EMERGENCY MEDICINE

## 2023-10-12 PROCEDURE — 80048 BASIC METABOLIC PNL TOTAL CA: CPT | Performed by: EMERGENCY MEDICINE

## 2023-10-12 PROCEDURE — 36415 COLL VENOUS BLD VENIPUNCTURE: CPT | Performed by: EMERGENCY MEDICINE

## 2023-10-12 PROCEDURE — 85025 COMPLETE CBC W/AUTO DIFF WBC: CPT | Performed by: EMERGENCY MEDICINE

## 2023-10-12 PROCEDURE — 250N000011 HC RX IP 250 OP 636: Mod: JZ | Performed by: EMERGENCY MEDICINE

## 2023-10-12 PROCEDURE — 99284 EMERGENCY DEPT VISIT MOD MDM: CPT | Mod: 25

## 2023-10-12 PROCEDURE — 96374 THER/PROPH/DIAG INJ IV PUSH: CPT

## 2023-10-12 PROCEDURE — 96361 HYDRATE IV INFUSION ADD-ON: CPT

## 2023-10-12 RX ORDER — CEPHALEXIN 250 MG/5ML
25 POWDER, FOR SUSPENSION ORAL 3 TIMES DAILY
Qty: 210 ML | Refills: 0 | Status: SHIPPED | OUTPATIENT
Start: 2023-10-12 | End: 2023-10-17

## 2023-10-12 RX ORDER — DIPHENHYDRAMINE HYDROCHLORIDE AND LIDOCAINE HYDROCHLORIDE AND ALUMINUM HYDROXIDE AND MAGNESIUM HYDRO
10 KIT EVERY 6 HOURS PRN
Status: DISCONTINUED | OUTPATIENT
Start: 2023-10-12 | End: 2023-10-12 | Stop reason: HOSPADM

## 2023-10-12 RX ORDER — PREDNISOLONE 15 MG/5 ML
45 SOLUTION, ORAL ORAL DAILY
Qty: 25 ML | Refills: 0 | Status: SHIPPED | OUTPATIENT
Start: 2023-10-12 | End: 2023-10-17

## 2023-10-12 RX ORDER — DEXAMETHASONE SODIUM PHOSPHATE 10 MG/ML
10 INJECTION, SOLUTION INTRAMUSCULAR; INTRAVENOUS ONCE
Status: COMPLETED | OUTPATIENT
Start: 2023-10-12 | End: 2023-10-12

## 2023-10-12 RX ADMIN — DIPHENHYDRAMINE HYDROCHLORIDE AND LIDOCAINE HYDROCHLORIDE AND ALUMINUM HYDROXIDE AND MAGNESIUM HYDRO 10 ML: KIT at 03:44

## 2023-10-12 RX ADMIN — DEXAMETHASONE SODIUM PHOSPHATE 10 MG: 10 INJECTION, SOLUTION INTRAMUSCULAR; INTRAVENOUS at 03:44

## 2023-10-12 RX ADMIN — SODIUM CHLORIDE 1000 ML: 9 INJECTION, SOLUTION INTRAVENOUS at 03:43

## 2023-10-12 ASSESSMENT — ACTIVITIES OF DAILY LIVING (ADL): ADLS_ACUITY_SCORE: 33

## 2023-10-12 NOTE — DISCHARGE INSTRUCTIONS
We are offering mild antibiotic to cover you for urine infection.  We have also offered a steroid and some codeine medications to help with the throat.  Please follow-up with your ENT if you continue to see off-and-on bleeding.  Bleeding is persistently heavy return to the emergency room for reassessment.  Thanks your patience tonight.

## 2023-10-12 NOTE — ED TRIAGE NOTES
Patient had tonsillectomy performed today. Patient reports bleeding from tonsils first at 11pm tonight. In triage, no bleeding noted on assessment. No difficulty breathing.

## 2023-10-13 NOTE — ED PROVIDER NOTES
History     Chief Complaint:  Post-op Problem       HPI   Belen HUNTER [place] is a 22 year old female who presents with multiple complaints    Patient is a 22-year-old female who just underwent tonsillectomy.  Patient's had some on and off bleeding.  Pain has been worse.  She has had some dysuria and frequency.  She had a urine test done at the clinic but was not told whether she had a urine infection and presents the emergency room for assessment.  Does have liquid oxycodone at home for pain.      Independent Historian:   None - Patient Only    Review of External Notes:          Medications:    cephALEXin (KEFLEX) 250 MG/5ML suspension  magic mouthwash suspension (diphenhydrAMINE, lidocaine, aluminum-magnesium & simethicone)  prednisoLONE (ORAPRED/PRELONE) 15 MG/5ML solution  albuterol (PROAIR HFA/PROVENTIL HFA/VENTOLIN HFA) 108 (90 Base) MCG/ACT inhaler        Past Medical History:    Past Medical History:   Diagnosis Date    Anxiety     Depression     Depressive disorder        Past Surgical History:    Past Surgical History:   Procedure Laterality Date    wisdom teeth extraction           Physical Exam   No data found.     Physical Exam  Vitals reviewed.   HENT:      Head: Normocephalic.      Right Ear: Tympanic membrane normal.      Left Ear: Tympanic membrane normal.      Nose: Nose normal.      Mouth/Throat:      Comments: Is evidence for recent tonsillectomy.  There is no active bleeding no hematoma or hemorrhage noted.  Cardiovascular:      Rate and Rhythm: Normal rate.   Pulmonary:      Effort: Pulmonary effort is normal.   Abdominal:      General: Abdomen is flat. Bowel sounds are normal.      Tenderness: There is no right CVA tenderness or left CVA tenderness.   Neurological:      General: No focal deficit present.      Mental Status: She is alert.   Psychiatric:         Mood and Affect: Mood normal.       \    Emergency Department Course     Imaging:  No orders to display          Laboratory:  Labs  Ordered and Resulted from Time of ED Arrival to Time of ED Departure   BASIC METABOLIC PANEL - Abnormal       Result Value    Sodium 133 (*)     Potassium 4.0      Chloride 101      Carbon Dioxide (CO2) 21 (*)     Anion Gap 11      Urea Nitrogen 11.3      Creatinine 0.63      GFR Estimate >90      Calcium 9.5      Glucose 93     CBC WITH PLATELETS AND DIFFERENTIAL - Abnormal    WBC Count 12.8 (*)     RBC Count 4.16      Hemoglobin 13.1      Hematocrit 37.9      MCV 91      MCH 31.5      MCHC 34.6      RDW 12.0      Platelet Count 218      % Neutrophils 74      % Lymphocytes 17      % Monocytes 9      Mids % (Monos, Eos, Basos)        % Eosinophils 0      % Basophils 0      % Immature Granulocytes 0      NRBCs per 100 WBC 0      Absolute Neutrophils 9.5 (*)     Absolute Lymphocytes 2.1      Absolute Monocytes 1.1      Mids Abs (Monos, Eos, Basos)        Absolute Eosinophils 0.0      Absolute Basophils 0.0      Absolute Immature Granulocytes 0.0      Absolute NRBCs 0.0            Emergency Department Course & Assessments:             Interventions:  Medications   sodium chloride 0.9% BOLUS 1,000 mL (0 mLs Intravenous Stopped 10/12/23 0756)   dexAMETHasone PF (DECADRON) injection 10 mg (10 mg Intravenous $Given 10/12/23 1236)        Assessments:      Independent Interpretation (X-rays, CTs, rhythm strip):  None    Consultations/Discussion of Management or Tests:  None        Social Determinants of Health affecting care:   None    Disposition:  The patient was discharged to home.     Impression & Plan        Medical Decision Making:  Patient presents with bleeding from her tonsils and difficulty with pain.  Patient is well-appearing.  No active hemorrhage noted observed in the emergency room without recurrence.  A dose of dexamethasone was given for tonsillectomy pain reviewed of her urinalysis which was contaminated patient continues to complain of dysuria and frequency.  I did offer her repeat urine testing or empiric  treatment patient chose empiric treatment.  We will offer Keflex to treat urinary tract infection as well as the ongoing steroid and coating medications for pain for the throat discharged home in stable condition patient asked to return with persistent heavy bleeding and follow-up with ENT surgery if bleeding continues.      Diagnosis:    ICD-10-CM    1. Post-tonsillectomy pain  G89.18     Z90.89            Discharge Medications:  Discharge Medication List as of 10/12/2023  4:20 AM        START taking these medications    Details   cephALEXin (KEFLEX) 250 MG/5ML suspension Take 14 mLs (700 mg) by mouth 3 times daily for 5 days, Disp-210 mL, R-0, Local Print      magic mouthwash suspension (diphenhydrAMINE, lidocaine, aluminum-magnesium & simethicone) Swish and swallow 10 mLs in mouth every 6 hours as needed for mouth sores or sore throat, Disp-120 mL, R-0, Local Print      prednisoLONE (ORAPRED/PRELONE) 15 MG/5ML solution Take 15 mLs (45 mg) by mouth daily for 5 days, Disp-25 mL, R-0, Local Print                Earle Hancock MD  10/13/2023   No att. providers found        Earle Hancock MD  10/13/23 9137

## 2023-10-15 ENCOUNTER — HOSPITAL ENCOUNTER (OUTPATIENT)
Facility: CLINIC | Age: 23
Setting detail: OBSERVATION
Discharge: HOME | End: 2023-10-17
Attending: EMERGENCY MEDICINE | Admitting: HOSPITALIST
Payer: COMMERCIAL

## 2023-10-15 DIAGNOSIS — J35.8 TONSILLAR BLEED: ICD-10-CM

## 2023-10-15 DIAGNOSIS — Z98.890 POSTOPERATIVE VOMITING: ICD-10-CM

## 2023-10-15 DIAGNOSIS — G89.18 POST-TONSILLECTOMY PAIN: Primary | ICD-10-CM

## 2023-10-15 DIAGNOSIS — R11.10 POSTOPERATIVE VOMITING: ICD-10-CM

## 2023-10-15 DIAGNOSIS — Z90.89 POST-TONSILLECTOMY PAIN: Primary | ICD-10-CM

## 2023-10-15 LAB
ABO/RH(D): NORMAL
ANTIBODY SCREEN: NEGATIVE
BASO+EOS+MONOS # BLD AUTO: NORMAL 10*3/UL
BASO+EOS+MONOS NFR BLD AUTO: NORMAL %
BASOPHILS # BLD AUTO: 0.1 10E3/UL (ref 0–0.2)
BASOPHILS NFR BLD AUTO: 1 %
EOSINOPHIL # BLD AUTO: 0.1 10E3/UL (ref 0–0.7)
EOSINOPHIL NFR BLD AUTO: 1 %
ERYTHROCYTE [DISTWIDTH] IN BLOOD BY AUTOMATED COUNT: 11.6 % (ref 10–15)
HCT VFR BLD AUTO: 40 % (ref 35–47)
HGB BLD-MCNC: 14.2 G/DL (ref 11.7–15.7)
IMM GRANULOCYTES # BLD: 0 10E3/UL
IMM GRANULOCYTES NFR BLD: 0 %
LYMPHOCYTES # BLD AUTO: 3.2 10E3/UL (ref 0.8–5.3)
LYMPHOCYTES NFR BLD AUTO: 39 %
MCH RBC QN AUTO: 31.8 PG (ref 26.5–33)
MCHC RBC AUTO-ENTMCNC: 35.5 G/DL (ref 31.5–36.5)
MCV RBC AUTO: 90 FL (ref 78–100)
MONOCYTES # BLD AUTO: 0.7 10E3/UL (ref 0–1.3)
MONOCYTES NFR BLD AUTO: 9 %
NEUTROPHILS # BLD AUTO: 4.2 10E3/UL (ref 1.6–8.3)
NEUTROPHILS NFR BLD AUTO: 50 %
NRBC # BLD AUTO: 0 10E3/UL
NRBC BLD AUTO-RTO: 0 /100
PLATELET # BLD AUTO: 277 10E3/UL (ref 150–450)
RBC # BLD AUTO: 4.46 10E6/UL (ref 3.8–5.2)
SPECIMEN EXPIRATION DATE: NORMAL
WBC # BLD AUTO: 8.3 10E3/UL (ref 4–11)

## 2023-10-15 PROCEDURE — 96374 THER/PROPH/DIAG INJ IV PUSH: CPT

## 2023-10-15 PROCEDURE — 86850 RBC ANTIBODY SCREEN: CPT | Performed by: EMERGENCY MEDICINE

## 2023-10-15 PROCEDURE — 86901 BLOOD TYPING SEROLOGIC RH(D): CPT | Performed by: EMERGENCY MEDICINE

## 2023-10-15 PROCEDURE — 94640 AIRWAY INHALATION TREATMENT: CPT

## 2023-10-15 PROCEDURE — 258N000003 HC RX IP 258 OP 636: Performed by: EMERGENCY MEDICINE

## 2023-10-15 PROCEDURE — 250N000009 HC RX 250: Performed by: EMERGENCY MEDICINE

## 2023-10-15 PROCEDURE — 80048 BASIC METABOLIC PNL TOTAL CA: CPT | Performed by: EMERGENCY MEDICINE

## 2023-10-15 PROCEDURE — 99285 EMERGENCY DEPT VISIT HI MDM: CPT | Mod: 25

## 2023-10-15 PROCEDURE — 36415 COLL VENOUS BLD VENIPUNCTURE: CPT | Performed by: EMERGENCY MEDICINE

## 2023-10-15 PROCEDURE — 250N000011 HC RX IP 250 OP 636: Mod: JZ | Performed by: EMERGENCY MEDICINE

## 2023-10-15 PROCEDURE — 85025 COMPLETE CBC W/AUTO DIFF WBC: CPT | Performed by: EMERGENCY MEDICINE

## 2023-10-15 RX ORDER — ONDANSETRON 2 MG/ML
4 INJECTION INTRAMUSCULAR; INTRAVENOUS
Status: DISCONTINUED | OUTPATIENT
Start: 2023-10-15 | End: 2023-10-16

## 2023-10-15 RX ADMIN — SODIUM CHLORIDE 1000 ML: 9 INJECTION, SOLUTION INTRAVENOUS at 23:58

## 2023-10-15 RX ADMIN — ONDANSETRON 4 MG: 2 INJECTION INTRAMUSCULAR; INTRAVENOUS at 23:58

## 2023-10-15 RX ADMIN — TRANEXAMIC ACID 500 MG: 1 INJECTION, SOLUTION INTRAVENOUS at 23:58

## 2023-10-16 PROBLEM — Z98.890 POSTOPERATIVE VOMITING: Status: ACTIVE | Noted: 2023-10-16

## 2023-10-16 PROBLEM — R11.0 NAUSEA: Status: ACTIVE | Noted: 2023-10-16

## 2023-10-16 PROBLEM — J35.8 TONSILLAR BLEED: Status: ACTIVE | Noted: 2023-10-16

## 2023-10-16 PROBLEM — R11.10 POSTOPERATIVE VOMITING: Status: ACTIVE | Noted: 2023-10-16

## 2023-10-16 LAB
ANION GAP SERPL CALCULATED.3IONS-SCNC: 13 MMOL/L (ref 7–15)
BUN SERPL-MCNC: 18.6 MG/DL (ref 6–20)
CALCIUM SERPL-MCNC: 9.6 MG/DL (ref 8.6–10)
CHLORIDE SERPL-SCNC: 102 MMOL/L (ref 98–107)
CREAT SERPL-MCNC: 0.73 MG/DL (ref 0.51–0.95)
DEPRECATED HCO3 PLAS-SCNC: 24 MMOL/L (ref 22–29)
EGFRCR SERPLBLD CKD-EPI 2021: >90 ML/MIN/1.73M2
GLUCOSE SERPL-MCNC: 89 MG/DL (ref 70–99)
HOLD SPECIMEN: NORMAL
POTASSIUM SERPL-SCNC: 3.8 MMOL/L (ref 3.4–5.3)
SODIUM SERPL-SCNC: 139 MMOL/L (ref 135–145)

## 2023-10-16 PROCEDURE — 96375 TX/PRO/DX INJ NEW DRUG ADDON: CPT

## 2023-10-16 PROCEDURE — 258N000003 HC RX IP 258 OP 636: Performed by: HOSPITALIST

## 2023-10-16 PROCEDURE — 250N000011 HC RX IP 250 OP 636: Mod: JZ | Performed by: HOSPITALIST

## 2023-10-16 PROCEDURE — 99222 1ST HOSP IP/OBS MODERATE 55: CPT | Performed by: HOSPITALIST

## 2023-10-16 PROCEDURE — 99207 PR APP CREDIT; MD BILLING SHARED VISIT: CPT | Performed by: NURSE PRACTITIONER

## 2023-10-16 PROCEDURE — 258N000003 HC RX IP 258 OP 636: Performed by: NURSE PRACTITIONER

## 2023-10-16 PROCEDURE — 96376 TX/PRO/DX INJ SAME DRUG ADON: CPT

## 2023-10-16 PROCEDURE — G0378 HOSPITAL OBSERVATION PER HR: HCPCS

## 2023-10-16 PROCEDURE — 250N000011 HC RX IP 250 OP 636: Mod: JZ | Performed by: EMERGENCY MEDICINE

## 2023-10-16 PROCEDURE — 250N000011 HC RX IP 250 OP 636: Mod: JZ | Performed by: NURSE PRACTITIONER

## 2023-10-16 RX ORDER — LORAZEPAM 2 MG/ML
.5-1 INJECTION INTRAMUSCULAR EVERY 4 HOURS PRN
Status: DISCONTINUED | OUTPATIENT
Start: 2023-10-16 | End: 2023-10-16

## 2023-10-16 RX ORDER — AMOXICILLIN 250 MG
2 CAPSULE ORAL 2 TIMES DAILY PRN
Status: DISCONTINUED | OUTPATIENT
Start: 2023-10-16 | End: 2023-10-17 | Stop reason: HOSPADM

## 2023-10-16 RX ORDER — ACETAMINOPHEN 325 MG/10.15ML
650 LIQUID ORAL EVERY 4 HOURS PRN
Status: DISCONTINUED | OUTPATIENT
Start: 2023-10-16 | End: 2023-10-17 | Stop reason: HOSPADM

## 2023-10-16 RX ORDER — NALOXONE HYDROCHLORIDE 0.4 MG/ML
0.2 INJECTION, SOLUTION INTRAMUSCULAR; INTRAVENOUS; SUBCUTANEOUS
Status: DISCONTINUED | OUTPATIENT
Start: 2023-10-16 | End: 2023-10-17 | Stop reason: HOSPADM

## 2023-10-16 RX ORDER — ACETAMINOPHEN 650 MG/1
650 SUPPOSITORY RECTAL EVERY 6 HOURS PRN
Status: DISCONTINUED | OUTPATIENT
Start: 2023-10-16 | End: 2023-10-17 | Stop reason: HOSPADM

## 2023-10-16 RX ORDER — BISACODYL 10 MG
10 SUPPOSITORY, RECTAL RECTAL DAILY PRN
Status: DISCONTINUED | OUTPATIENT
Start: 2023-10-16 | End: 2023-10-17 | Stop reason: HOSPADM

## 2023-10-16 RX ORDER — PREDNISOLONE 15 MG/5 ML
45 SOLUTION, ORAL ORAL DAILY
Status: DISCONTINUED | OUTPATIENT
Start: 2023-10-16 | End: 2023-10-16

## 2023-10-16 RX ORDER — ONDANSETRON 2 MG/ML
4 INJECTION INTRAMUSCULAR; INTRAVENOUS EVERY 6 HOURS PRN
Status: DISCONTINUED | OUTPATIENT
Start: 2023-10-16 | End: 2023-10-17 | Stop reason: HOSPADM

## 2023-10-16 RX ORDER — KETOROLAC TROMETHAMINE 30 MG/ML
30 INJECTION, SOLUTION INTRAMUSCULAR; INTRAVENOUS EVERY 6 HOURS PRN
Status: DISCONTINUED | OUTPATIENT
Start: 2023-10-16 | End: 2023-10-17 | Stop reason: HOSPADM

## 2023-10-16 RX ORDER — PROCHLORPERAZINE MALEATE 5 MG
10 TABLET ORAL EVERY 6 HOURS PRN
Status: DISCONTINUED | OUTPATIENT
Start: 2023-10-16 | End: 2023-10-17 | Stop reason: HOSPADM

## 2023-10-16 RX ORDER — OXYCODONE HYDROCHLORIDE 5 MG/1
5-10 TABLET ORAL EVERY 4 HOURS PRN
Status: ON HOLD | COMMUNITY
End: 2023-10-17

## 2023-10-16 RX ORDER — DEXAMETHASONE SODIUM PHOSPHATE 10 MG/ML
4 INJECTION, SOLUTION INTRAMUSCULAR; INTRAVENOUS EVERY 6 HOURS
Status: DISCONTINUED | OUTPATIENT
Start: 2023-10-16 | End: 2023-10-17 | Stop reason: HOSPADM

## 2023-10-16 RX ORDER — DEXTROSE MONOHYDRATE, SODIUM CHLORIDE, AND POTASSIUM CHLORIDE 50; 1.49; 4.5 G/1000ML; G/1000ML; G/1000ML
INJECTION, SOLUTION INTRAVENOUS CONTINUOUS
Status: DISCONTINUED | OUTPATIENT
Start: 2023-10-16 | End: 2023-10-17 | Stop reason: HOSPADM

## 2023-10-16 RX ORDER — ACETAMINOPHEN 325 MG/1
650 TABLET ORAL EVERY 6 HOURS PRN
Status: DISCONTINUED | OUTPATIENT
Start: 2023-10-16 | End: 2023-10-16

## 2023-10-16 RX ORDER — POLYETHYLENE GLYCOL 3350 17 G/17G
17 POWDER, FOR SOLUTION ORAL DAILY PRN
Status: DISCONTINUED | OUTPATIENT
Start: 2023-10-16 | End: 2023-10-17 | Stop reason: HOSPADM

## 2023-10-16 RX ORDER — LORAZEPAM 2 MG/ML
.5-1 INJECTION INTRAMUSCULAR EVERY 4 HOURS PRN
Status: DISCONTINUED | OUTPATIENT
Start: 2023-10-16 | End: 2023-10-17 | Stop reason: HOSPADM

## 2023-10-16 RX ORDER — AMOXICILLIN 250 MG
1 CAPSULE ORAL 2 TIMES DAILY PRN
Status: DISCONTINUED | OUTPATIENT
Start: 2023-10-16 | End: 2023-10-17 | Stop reason: HOSPADM

## 2023-10-16 RX ORDER — CLINDAMYCIN PHOSPHATE 600 MG/50ML
600 INJECTION, SOLUTION INTRAVENOUS EVERY 8 HOURS
Status: DISCONTINUED | OUTPATIENT
Start: 2023-10-16 | End: 2023-10-17 | Stop reason: HOSPADM

## 2023-10-16 RX ORDER — CEPHALEXIN 250 MG/5ML
25 POWDER, FOR SUSPENSION ORAL 3 TIMES DAILY
Status: DISCONTINUED | OUTPATIENT
Start: 2023-10-16 | End: 2023-10-16

## 2023-10-16 RX ORDER — DEXAMETHASONE SODIUM PHOSPHATE 10 MG/ML
10 INJECTION, SOLUTION INTRAMUSCULAR; INTRAVENOUS ONCE
Status: COMPLETED | OUTPATIENT
Start: 2023-10-16 | End: 2023-10-16

## 2023-10-16 RX ORDER — ONDANSETRON 4 MG/1
4 TABLET, ORALLY DISINTEGRATING ORAL EVERY 6 HOURS PRN
Status: DISCONTINUED | OUTPATIENT
Start: 2023-10-16 | End: 2023-10-17 | Stop reason: HOSPADM

## 2023-10-16 RX ORDER — PROCHLORPERAZINE 25 MG
25 SUPPOSITORY, RECTAL RECTAL EVERY 12 HOURS PRN
Status: DISCONTINUED | OUTPATIENT
Start: 2023-10-16 | End: 2023-10-17 | Stop reason: HOSPADM

## 2023-10-16 RX ORDER — NALOXONE HYDROCHLORIDE 0.4 MG/ML
0.4 INJECTION, SOLUTION INTRAMUSCULAR; INTRAVENOUS; SUBCUTANEOUS
Status: DISCONTINUED | OUTPATIENT
Start: 2023-10-16 | End: 2023-10-17 | Stop reason: HOSPADM

## 2023-10-16 RX ORDER — HYDROMORPHONE HCL IN WATER/PF 6 MG/30 ML
0.2 PATIENT CONTROLLED ANALGESIA SYRINGE INTRAVENOUS EVERY 6 HOURS PRN
Status: DISCONTINUED | OUTPATIENT
Start: 2023-10-16 | End: 2023-10-17 | Stop reason: HOSPADM

## 2023-10-16 RX ADMIN — DEXAMETHASONE SODIUM PHOSPHATE 4 MG: 10 INJECTION, SOLUTION INTRAMUSCULAR; INTRAVENOUS at 16:32

## 2023-10-16 RX ADMIN — POTASSIUM CHLORIDE, DEXTROSE MONOHYDRATE AND SODIUM CHLORIDE: 150; 5; 450 INJECTION, SOLUTION INTRAVENOUS at 02:55

## 2023-10-16 RX ADMIN — DEXAMETHASONE SODIUM PHOSPHATE 4 MG: 10 INJECTION, SOLUTION INTRAMUSCULAR; INTRAVENOUS at 22:49

## 2023-10-16 RX ADMIN — KETOROLAC TROMETHAMINE 30 MG: 30 INJECTION, SOLUTION INTRAMUSCULAR; INTRAVENOUS at 17:21

## 2023-10-16 RX ADMIN — HYDROMORPHONE HYDROCHLORIDE 0.2 MG: 0.2 INJECTION, SOLUTION INTRAMUSCULAR; INTRAVENOUS; SUBCUTANEOUS at 10:25

## 2023-10-16 RX ADMIN — DEXAMETHASONE SODIUM PHOSPHATE 4 MG: 10 INJECTION, SOLUTION INTRAMUSCULAR; INTRAVENOUS at 11:09

## 2023-10-16 RX ADMIN — POTASSIUM CHLORIDE, DEXTROSE MONOHYDRATE AND SODIUM CHLORIDE: 150; 5; 450 INJECTION, SOLUTION INTRAVENOUS at 19:52

## 2023-10-16 RX ADMIN — ONDANSETRON 4 MG: 2 INJECTION INTRAMUSCULAR; INTRAVENOUS at 19:59

## 2023-10-16 RX ADMIN — CLINDAMYCIN PHOSPHATE 600 MG: 600 INJECTION, SOLUTION INTRAVENOUS at 19:52

## 2023-10-16 RX ADMIN — KETOROLAC TROMETHAMINE 30 MG: 30 INJECTION, SOLUTION INTRAMUSCULAR; INTRAVENOUS at 03:31

## 2023-10-16 RX ADMIN — KETOROLAC TROMETHAMINE 30 MG: 30 INJECTION, SOLUTION INTRAMUSCULAR; INTRAVENOUS at 11:14

## 2023-10-16 RX ADMIN — HYDROMORPHONE HYDROCHLORIDE 0.2 MG: 0.2 INJECTION, SOLUTION INTRAMUSCULAR; INTRAVENOUS; SUBCUTANEOUS at 16:32

## 2023-10-16 RX ADMIN — CLINDAMYCIN PHOSPHATE 600 MG: 600 INJECTION, SOLUTION INTRAVENOUS at 11:39

## 2023-10-16 RX ADMIN — POTASSIUM CHLORIDE, DEXTROSE MONOHYDRATE AND SODIUM CHLORIDE: 150; 5; 450 INJECTION, SOLUTION INTRAVENOUS at 11:16

## 2023-10-16 RX ADMIN — DEXAMETHASONE SODIUM PHOSPHATE 10 MG: 10 INJECTION, SOLUTION INTRAMUSCULAR; INTRAVENOUS at 00:36

## 2023-10-16 ASSESSMENT — ACTIVITIES OF DAILY LIVING (ADL)
ADLS_ACUITY_SCORE: 18
ADLS_ACUITY_SCORE: 19
ADLS_ACUITY_SCORE: 35
ADLS_ACUITY_SCORE: 19
ADLS_ACUITY_SCORE: 19
ADLS_ACUITY_SCORE: 18
ADLS_ACUITY_SCORE: 35
ADLS_ACUITY_SCORE: 19
ADLS_ACUITY_SCORE: 18
ADLS_ACUITY_SCORE: 19

## 2023-10-16 NOTE — ED TRIAGE NOTES
Pt had her tonsils removed on Wednesday. Pt started vomiting blood 20 min ago. Pt feeling lightheaded and dizzy

## 2023-10-16 NOTE — PLAN OF CARE
PRIMARY DIAGNOSIS: POST-OPERATIVE NAUSEA AND VOMITING     OUTPATIENT/OBSERVATION GOALS TO BE MET BEFORE DISCHARGE  1. Orthostatic performed: No  2. Tolerating PO medications: No  3. Return to near baseline physical activity: Yes  4. Cleared for discharge by consultants (if involved): No    Discharge Planner Nurse   Safe discharge environment identified: Yes  Barriers to discharge: Yes       Entered by: Carolina Brennan RN 10/16/2023 10:32 AM     Please review provider order for any additional goals. Nurse to notify provider when observation goals have been met and patient is ready for discharge.    Alert and oriented. Up independently. Right IV site infusing with fluids. Reports pain in throat and with swallowing. Vital signs q 4 hours. PRN DILAUDID given for 7/10 throat pain. Denies numbness and tingling.     Vital signs:  Temp: 97.6  F (36.4  C) Temp src: Oral BP: 113/72 Pulse: 66   Resp: 18 SpO2: 99 % O2 Device: None (Room air)

## 2023-10-16 NOTE — ED NOTES
Bed: ED15  Expected date:   Expected time:   Means of arrival:   Comments:  Triage post tonsil bleed

## 2023-10-16 NOTE — PLAN OF CARE
PRIMARY DIAGNOSIS: POST-OPERATIVE NAUSEA AND VOMITING     OUTPATIENT/OBSERVATION GOALS TO BE MET BEFORE DISCHARGE  1. Orthostatic performed: No  2. Tolerating PO medications: No  3. Return to near baseline physical activity: Yes  4. Cleared for discharge by consultants (if involved): No    Discharge Planner Nurse   Safe discharge environment identified: Yes  Barriers to discharge: Yes       Entered by: Carolina Brennan RN 10/16/2023 4:38 PM     Please review provider order for any additional goals. Nurse to notify provider when observation goals have been met and patient is ready for discharge.    Alert and oriented. Up independently. Right IV site infusing with fluids. Reports throat pain - ice utilized. Vital signs q 4 hours. PRN DILAUDID given for pain. Was able to eat and drink some, but still had pain with that.

## 2023-10-16 NOTE — ED PROVIDER NOTES
"  History     Chief Complaint:  Post-op Problem       HPI   Belen Dunlap is a 22 year old female who had a tonsillectomy on 10/11 and now presents with post-op problem. The patient states that since her surgery she has had progressively worsening nausea, sore throat, with some fevers. She states that around 2320 she began to spit up blood consistently along with vomiting. She was then seen in the ED on 10/12 where she was prescribed Keflex and Prednisolone.      Independent Historian:   None - Patient Only    Review of External Notes:   I reviewed the patients procedural note from 10/11 as well as ED note from 10/12.    Medications:    Keflex   Albuterol   Oxycodone     Past Medical History:    Anxiety   Depression   Suicidal ideation     Past Surgical History:    [place] teeth extraction   Tonsillectomy      Physical Exam   Patient Vitals for the past 24 hrs:   BP Temp Temp src Pulse Resp SpO2 Height Weight   10/15/23 2331 (!) 127/94 98.8  F (37.1  C) Temporal 94 18 100 % 1.626 m (5' 4\") 81.6 kg (180 lb)        Physical Exam  Constitutional: Alert, attentive  HENT:    Nose normal.    Posterior pharynx shows trace bleeding from the bilateral tonsils, no obvious eschar or clot remaining    Normal midline uvula   No peritonsillar erythema or swelling   No sublingual induration, swelling or tenderness   No trismus   Able to extend neck without discomfort   Tolerating secretions and able to breathe supine with ease  Eyes: EOM are normal. Pupils are equal, round, and reactive to light.   CV: regular rate and rhythm; no murmurs, rubs or gallups  Chest: Effort normal and breath sounds normal.   GI:  There is no tenderness. No distension. Normal bowel sounds  MSK: Normal range of motion.   Neurological: Alert, attentive  Skin: Skin is warm and dry.      Emergency Department Course     Laboratory:  Labs Ordered and Resulted from Time of ED Arrival to Time of ED Departure   BASIC METABOLIC PANEL - Normal       Result " Value    Sodium 139      Potassium 3.8      Chloride 102      Carbon Dioxide (CO2) 24      Anion Gap 13      Urea Nitrogen 18.6      Creatinine 0.73      GFR Estimate >90      Calcium 9.6      Glucose 89     CBC WITH PLATELETS AND DIFFERENTIAL    WBC Count 8.3      RBC Count 4.46      Hemoglobin 14.2      Hematocrit 40.0      MCV 90      MCH 31.8      MCHC 35.5      RDW 11.6      Platelet Count 277      % Neutrophils 50      % Lymphocytes 39      % Monocytes 9      Mids % (Monos, Eos, Basos)        % Eosinophils 1      % Basophils 1      % Immature Granulocytes 0      NRBCs per 100 WBC 0      Absolute Neutrophils 4.2      Absolute Lymphocytes 3.2      Absolute Monocytes 0.7      Mids Abs (Monos, Eos, Basos)        Absolute Eosinophils 0.1      Absolute Basophils 0.1      Absolute Immature Granulocytes 0.0      Absolute NRBCs 0.0     TYPE AND SCREEN, ADULT    ABO/RH(D) O POS      Antibody Screen Negative      SPECIMEN EXPIRATION DATE [id number]     ABO/RH TYPE AND SCREEN      Emergency Department Course & Assessments:       Interventions:  Medications   ondansetron (ZOFRAN) injection 4 mg (4 mg Intravenous $Given 10/15/23 2358)   sodium chloride 0.9% BOLUS 1,000 mL (0 mLs Intravenous Stopped 10/16/23 0028)   tranexamic acid (CYKLOKAPRON) 100 mg/mL inhalation solution 500 mg (500 mg Nebulization $Given 10/15/23 2358)   dexAMETHasone PF (DECADRON) injection 10 mg (10 mg Intravenous $Given 10/16/23 0036)      Assessments:  2338 I consulted with the patient and obtained history as shown above  2350 I rechecked the patient and explained exam results     Independent Interpretation (X-rays, CTs, rhythm strip):  None    Consultations/Discussion of Management or Tests:  0119 I consulted with [facility] about the patient and plan of care  0120 I consulted with Dr. Abraham about the patient and plan of care        Social Determinants of Health affecting care:   None    Disposition:  The patient was admitted to the hospital  under the care of Dr. Abraham.     Impression & Plan    CMS Diagnoses: None    Medical Decision Making:  This is a 22-year-old female who is 4 days postop tonsillectomy for chronic tonsillitis and tonsillar hypertrophy who presents for evaluation of tonsillar bleeding, also noting poor p.o. intake and recurrent nausea and vomiting since surgery.  After suction, TXA neb treatment, ice water wrist, there is no obvious bleeding.  The patient relates significant recent nausea vomiting and poor p.o. intake in the context of postop cares.  Her recent UTI may be contributing to this, but there is no exam evidence of sepsis.  Electrolytes are within normal limits.  It may be that her vomiting is precipitated some of this postop bleeding.  She is stabilized from an airway and bleeding perspective, but exhibits evidence of dehydration and is at risk for worsening of the same.  Plan observation unit admission and continue supportive cares.  ENT consult in the morning and likely discharge home within 24 hours.      Diagnosis:    ICD-10-CM    1. Postoperative vomiting  R11.10     Z98.890       2. Tonsillar bleed  J35.8         Scribe Disclosure:  I, Vinay Rizzo, am serving as a scribe at 11:45 PM on 10/15/2023 to document services personally performed by Lalit Mina MD based on my observations and the provider's statements to me.     10/15/2023   Lalit Mina MD [place], John Eric, MD  10/16/23 0245

## 2023-10-16 NOTE — PLAN OF CARE
PRIMARY DIAGNOSIS: POST-OPERATIVE NAUSEA AND VOMITING     OUTPATIENT/OBSERVATION GOALS TO BE MET BEFORE DISCHARGE  1. Orthostatic performed: No  2. Tolerating PO medications: No  3. Return to near baseline physical activity: Yes  4. Cleared for discharge by consultants (if involved): No    Discharge Planner Nurse   Safe discharge environment identified: Yes  Barriers to discharge: Yes       Entered by: Carolina Brennan RN 10/16/2023 12:50 PM     Please review provider order for any additional goals. Nurse to notify provider when observation goals have been met and patient is ready for discharge.    Alert and oriented. Up independently. Right IV site infusing with fluids. Reports pain in throat and with swallowing. Vital signs q 4 hours. PRN TORADOL given for pain, reports it at 5/10. Ice pack utilized on throat. Patient denies numbness and tingling.    Vital signs:  Temp: 97.8  F (36.6  C) Temp src: Oral BP: 130/89 Pulse: 65   Resp: 18 SpO2: 99 % O2 Device: None (Room air)

## 2023-10-16 NOTE — PLAN OF CARE
PRIMARY DIAGNOSIS: Nausea and Vomiting  OUTPATIENT/OBSERVATION GOALS TO BE MET BEFORE DISCHARGE:  ADLs back to baseline: Yes    Activity and level of assistance: Ambulating independently.    Pain status: Requiring IV medications    Return to near baseline physical activity: Yes     Discharge Planner Nurse   Safe discharge environment identified: Yes  Barriers to discharge: Yes       Entered by: Johnnie Khalil RN 10/16/2023 4:54 AM  Pt is A&O x4, Pt complained of pain at 8/10 and stated she would be unable to swallow Prn tylenol due to throat pain. Xcover ordered PRN IV Toradol. Plan is to consider ENT consult if bleeding starts again. Pt is resting in bed and able to make needs known. Will continue to follow plan of care.    Please review provider order for any additional goals.   Nurse to notify provider when observation goals have been met and patient is ready for discharge.

## 2023-10-16 NOTE — H&P
"[facility]  Hospitalist H&P    Name: Belen Dunlap      MRN: [id number]  YOB: 2000    Age: 22 year old  Date of admission: 10/15/2023  Primary care provider: No Ref-Primary, Physician            Assessment and Plan:     Belen Dunlap is a 22 year old female no significant medical history but underwent a tonsillectomy on 10/11 presents with intractable nausea and vomiting.    Postoperative intractable nausea and vomiting: Somewhat unclear etiology.  She is about 5 days out from her surgery.  She has had some intermittent bleeding from the back of the throat which may be contributing somewhat.  She tells me she is not using much of her oxycodone.  Her abdomen is soft and she denies any abdominal pain.  Her labs look unremarkable.  She currently looks quite comfortable.  For now we will register her to observation and provide supportive care with IV fluids and antiemetics.  Recent tonsillectomy with postoperative bleeding: She did have some bleeding from her pharynx in the emergency department.  Its not clear if this was related to irritation of the tonsillar area from her vomiting.  Nevertheless it is now resolved after transischemic acid and cares provided by Dr. Mina.  I will hold off on ENT consult for now but if there is further bleeding this could be considered in the morning.  It appears as though she was prescribed Keflex and prednisolone at her ED visit on 10/12, it would be reasonable to continue these medications moving forward.    Clinically Significant Risk Factors Present on Admission                       # Obesity: Estimated body mass index is 30.9 kg/m  as calculated from the following:    Height as of this encounter: 1.626 m (5' 4\").    Weight as of this encounter: 81.6 kg (180 lb).              Code status: Full.  Admit to observation status.  Prophylaxis: PCD's.  Disposition: Likely home later on 10/16.    60 MINUTES SPENT BY ME on the date of " service doing chart review, history, exam, documentation & further activities per the note.          Chief Complaint:     Nausea and vomiting.         History of Present Illness:   Belen Dunlap is a 22 year old female who presents with nausea and vomiting.  History was obtained from my discussion with the patient at the bedside.  I also discussed the case with the ED provider.  The electronic medical record was also reviewed.    The patient underwent a tonsillectomy on 10/11.  Since that surgery she has had some intermittent bleeding in the back of the throat.  She also reports some fairly significant pain in that area.  Most notably however she has had intractable nausea and nonbloody vomiting.  She denies abdominal pain.  She has been taking some oxycodone but not a tremendous amount.  She has not been able to eat or drink much.  She was actually seen in the ED on 10/2 and was prescribed Keflex and prednisone.  Her symptoms have continued so she came to the emergency department for evaluation.            Past Medical History:     Past Medical History:   Diagnosis Date    Anxiety     Depression     Depressive disorder              Past Surgical History:     Past Surgical History:   Procedure Laterality Date    wisdom teeth extraction                Social History:     Social History     Tobacco Use    Smoking status: Never    Smokeless tobacco: Never   Substance Use Topics    Alcohol use: Yes     Alcohol/week: 4.0 standard drinks of alcohol     Types: 4 Shots of liquor per week             Family History:   The family history was fully reviewed and non-contributory in this case.         Allergies:   No Known Allergies          Medications:     Prior to Admission medications    Medication Sig Last Dose Taking? Auth Provider Long Term End Date   albuterol (PROAIR HFA/PROVENTIL HFA/VENTOLIN HFA) 108 (90 Base) MCG/ACT inhaler Inhale 2 puffs into the lungs every 6 hours as needed for shortness of breath / dyspnea  "or wheezing   Unknown, Entered By History Yes    cephALEXin (KEFLEX) 250 MG/5ML suspension Take 14 mLs (700 mg) by mouth 3 times daily for 5 days   Earle Hancock MD  10/17/23   magic mouthwash suspension (diphenhydrAMINE, lidocaine, aluminum-magnesium & simethicone) Swish and swallow 10 mLs in mouth every 6 hours as needed for mouth sores or sore throat   Earle Hancock MD No    prednisoLONE (ORAPRED/PRELONE) 15 MG/5ML solution Take 15 mLs (45 mg) by mouth daily for 5 days   Earle Hancock MD  10/17/23             Review of Systems:     A Comprehensive greater than 10 system review of systems was carried out.  Pertinent positives and negatives are noted above.  Otherwise negative for contributory information.           Physical Exam:   Blood pressure (!) 127/94, pulse 94, temperature 98.8  F (37.1  C), temperature source Temporal, resp. rate 18, height 1.626 m (5' 4\"), weight 81.6 kg (180 lb), last menstrual period 08/28/2023, SpO2 100%.  Wt Readings from Last 1 Encounters:   10/15/23 81.6 kg (180 lb)     Exam:  GENERAL: No apparent distress. Awake, alert, and fully oriented.  HEENT: Normocephalic, atraumatic. Extraocular movements intact.  CARDIOVASCULAR: Regular rate and rhythm without murmurs or rubs. No S3.  PULMONARY: Clear to auscultation bilaterally.  ABDOMINAL: Soft, non-tender, non-distended. Bowel sounds normoactive.   EXTREMITIES: No cyanosis or clubbing. No appreciable edema.  NEUROLOGICAL: CN 2-12 grossly intact, no focal neurological deficits.  DERMATOLOGICAL: No rash, ulcer, bruising, nor jaundice.          Data:   Laboratory:  Recent Labs   Lab 10/15/23  2339 10/12/23  0331 10/09/23  0832   WBC 8.3 12.8* 7.7   HGB 14.2 13.1 14.2   HCT 40.0 37.9 40.4   MCV 90 91 90    218 210     Recent Labs   Lab 10/15/23  2339 10/12/23  0331 10/09/23  0832    133* 140   POTASSIUM 3.8 4.0 4.2   CHLORIDE 102 101 107   CO2 24 21* 19*   ANIONGAP 13 11 14   GLC 89 93 85   BUN 18.6 " "11.3 12.8   CR 0.73 0.63 0.70   GFRESTIMATED >90 >90 >90   BEBO 9.6 9.5 9.3     No results for input(s): \"CULT\" in the last 168 hours.    Imaging:  No results found for this or any previous visit (from the past 24 hour(s)).    Lalit Abraham DO MPH  [facility] hospital Hospitalist  [address] [place] [address]  10/16/2023      "

## 2023-10-16 NOTE — PROGRESS NOTES
[facility]    Medicine Progress Note - Hospitalist Service    Date of Admission:  10/15/2023    Assessment & Plan     Belen Dunlap is a 22 year old female no significant medical history but underwent a tonsillectomy on 10/11 by Dr. Cedeño ([facility]) at [facility] ([facility]) presents with intractable nausea and vomiting.  She was also seen here at [facility] on 10/12 and started on cephalexin suspension, magic mouth wash, and prednisolone.  She return on 10/15 due to concerns of ongoing symptoms and poor oral intake.     Acute medical issues:    #Recalcitrant nausea and vomiting of unclear etiology -- most likely pain (however, does have an underlying history of anxiety, so anxiety on differential) and recent tonsillectomy.  She notes intermittent bleeding from the posterior oral pharynx which may also be contributing somewhat.  She tells us that she is not using much of her oxycodone and has not been able to tolerate oral medications given in the ED on 10/12 -- cephalexin, magic mouth wash, and prednisolone. Lab are reassuring.  She looks quite comfortable.     #Recent tonsillectomy with postoperative bleeding:  She endorses some posterior bleeding in the oral pharynx.  Likely irritation to the tonsillar area from vomiting.  Given TXA and local cares.  No evidence of bleeding at present.       -- Admit to observation  -- CLD advance to full liquid diet and soft foods as tolerated.  -- Continue MIVF and antiemetics.  Added indication for lorazepam for nausea/vomiting (in addition to anxiety).   -- Received dexamethasone 10 mg in the ED.  Continue dexamethasone 4 mg IV q6hrs for now (help with swelling and nausea).   -- Clindamycin 600 mg IV q8hrs for now.  -- Trend labs   -- Call out to ENT surgeon. Awaiting return call.      Chronic medical issues:  #Anxiety, depression, hx of SI:  Mildly anxious but appropriate.  Denies SI.   #Obesity: BMI 30.  Complicates cares.         Observation Goals:  "-diagnostic tests and consults completed and resulted, -vital signs normal or at patient baseline, -tolerating oral intake to maintain hydration, -adequate pain control on oral analgesics, -tolerating oral antibiotics or has plans for home infusion setup, -infection is improving, -dyspnea improved and O2 sats greater than 88% on room air or prior home oxygen levels, -returns to baseline functional status, -safe disposition plan has been identified, Nurse to notify provider when observation goals have been met and patient is ready for discharge.  Diet: Regular Diet Adult    DVT Prophylaxis: Low Risk/Ambulatory with no VTE prophylaxis indicated  Karimi Catheter: Not present  Lines: None     Cardiac Monitoring: None  Code Status: Full Code      Clinically Significant Risk Factors Present on Admission                       # Obesity: Estimated body mass index is 30.36 kg/m  as calculated from the following:    Height as of this encounter: 1.626 m (5' 4\").    Weight as of this encounter: 80.2 kg (176 lb 14.4 oz).              Disposition Plan      Expected Discharge Date: 10/17/2023                  The patient's care was discussed with the Bedside Nurse, Care Coordinator/, Patient, and Patient's Family.    JESUSITA Puckett [facility]  Hospitalist Service  [facility]  Securely message with [employer] (more info)  Text page via [employer] Paging/Directory   ______________________________________________________________________    Interval History   Assumed care of patient.  Chart, imaging, and data reviewed.  Still with nausea. No further emesis.    Call out to ENT ([facility] -- [facility]) to discuss.    Physical Exam   Vital Signs: Temp: 97.6  F (36.4  C) Temp src: Oral BP: 113/72 Pulse: 66   Resp: 18 SpO2: 99 % O2 Device: None (Room air)    Weight: 176 lbs 14.4 oz    GEN:   Alert, oriented x 3, appears comfortable, NAD.  NECK:   Supple ,no mass or thyromegaly.  Mild LAD as expect " (anterior)  HEENT:  Normocephalic/atraumatic, no scleral icterus, no nasal discharge, mouth moist. Posterior pharynx tonsillar surgical site without eschar or clot.  Moderate erythema. No trismus.  Tolerating oral secretions.  CV:   Regular rate and rhythm, no murmur or JVD.  S1 + S2 noted, no S3 or S4.  LUNGS:   Clear to auscultation bilaterally without rales/rhonchi/wheezing/retractions.  Symmetric chest rise on inhalation noted.  ABD:   Active bowel sounds, soft, non-tender/non-distended.  No rebound/guarding/rigidity.  EXT:   No edema.  No cyanosis.  No joint synovitis noted.  SKIN:   Dry to touch, no exanthems noted in the visualized areas.  Neurologic: Grossly intact,non focal. Spine: No cervical spine tenderness.   Neuropsychiatric:  General: normal, calm and normal eye contact  Level of consciousness: alert / normal  Affect: normal  Orientation: oriented to self, place, time and situation     Medical Decision Making       45 MINUTES SPENT BY ME on the date of service doing chart review, history, exam, documentation & further activities per the note.      Data   ------------------------- PAST 24 HR DATA REVIEWED -----------------------------------------------    I have personally reviewed the following data over the past 24 hrs:    8.3  \   14.2   / 277     139 102 18.6 /  89   3.8 24 0.73 \       Imaging results reviewed over the past 24 hrs:   No results found for this or any previous visit (from the past 24 hour(s)).

## 2023-10-16 NOTE — PHARMACY-ADMISSION MEDICATION HISTORY
Pharmacist Admission Medication History    Admission medication history is complete. The information provided in this note is only as accurate as the sources available at the time of the update.    Information Source(s): Patient and CareEverywhere/SureScripts via in-person    Pertinent Information: Pt was prescribed Keflex & Prednisolone on 10/12/2023 x5 days, Pt has not been compliant with taking both meds. Last doses was taken on 10/13/2023.    Changes made to PTA medication list:  Added: Oxycodone  Deleted: None  Changed: None    Medication Affordability:  Not including over the counter (OTC) medications, was there a time in the past 3 months when you did not take your medications as prescribed because of cost?: No    Allergies reviewed with patient and updates made in EHR: yes    Medication History Completed By: Gaby Campos RPH 10/16/2023 9:13 AM    PTA Med List   Medication Sig Last Dose    albuterol (PROAIR HFA/PROVENTIL HFA/VENTOLIN HFA) 108 (90 Base) MCG/ACT inhaler Inhale 2 puffs into the lungs every 6 hours as needed for shortness of breath / dyspnea or wheezing prn    cephALEXin (KEFLEX) 250 MG/5ML suspension Take 14 mLs (700 mg) by mouth 3 times daily for 5 days 10/14/2023    magic mouthwash suspension (diphenhydrAMINE, lidocaine, aluminum-magnesium & simethicone) Swish and swallow 10 mLs in mouth every 6 hours as needed for mouth sores or sore throat prn    oxyCODONE (ROXICODONE) 5 MG tablet Take 5-10 mg by mouth every 4 hours as needed for severe pain 10/14/2023    prednisoLONE (ORAPRED/PRELONE) 15 MG/5ML solution Take 15 mLs (45 mg) by mouth daily for 5 days 10/14/2023

## 2023-10-16 NOTE — ED NOTES
"[facility]  ED Nurse Handoff Report    ED Chief complaint: Post-op Problem  . ED Diagnosis:   Final diagnoses:   Postoperative vomiting   Tonsillar bleed       Allergies: No Known Allergies    Code Status: Full Code    Activity level - Baseline/Home:  independent.  Activity Level - Current:   independent.   Lift room needed: No.   Bariatric: No   Needed: No   Isolation: No.   Infection: Not Applicable.     Respiratory status: Room air    Vital Signs (within 30 minutes):   Vitals:    10/15/23 2331   BP: (!) 127/94   Pulse: 94   Resp: 18   Temp: 98.8  F (37.1  C)   TempSrc: Temporal   SpO2: 100%   Weight: 81.6 kg (180 lb)   Height: 1.626 m (5' 4\")       Cardiac Rhythm:  ,      Pain level:    Patient confused: No.   Patient Falls Risk: nonskid shoes/slippers when out of bed and patient and family education.   Elimination Status: Has voided     Patient Report - Initial Complaint: Post-op bleeding.   Focused Assessment: post op bleeding and vomiting after tonsillectomy      Abnormal Results:   Labs Ordered and Resulted from Time of ED Arrival to Time of ED Departure   BASIC METABOLIC PANEL - Normal       Result Value    Sodium 139      Potassium 3.8      Chloride 102      Carbon Dioxide (CO2) 24      Anion Gap 13      Urea Nitrogen 18.6      Creatinine 0.73      GFR Estimate >90      Calcium 9.6      Glucose 89     CBC WITH PLATELETS AND DIFFERENTIAL    WBC Count 8.3      RBC Count 4.46      Hemoglobin 14.2      Hematocrit 40.0      MCV 90      MCH 31.8      MCHC 35.5      RDW 11.6      Platelet Count 277      % Neutrophils 50      % Lymphocytes 39      % Monocytes 9      Mids % (Monos, Eos, Basos)        % Eosinophils 1      % Basophils 1      % Immature Granulocytes 0      NRBCs per 100 WBC 0      Absolute Neutrophils 4.2      Absolute Lymphocytes 3.2      Absolute Monocytes 0.7      Mids Abs (Monos, Eos, Basos)        Absolute Eosinophils 0.1      Absolute Basophils 0.1      Absolute " Immature Granulocytes 0.0      Absolute NRBCs 0.0     TYPE AND SCREEN, ADULT    ABO/RH(D) O POS      Antibody Screen Negative      SPECIMEN EXPIRATION DATE [id number]     ABO/RH TYPE AND SCREEN        No orders to display       Treatments provided: IV, labs, meds, TXA neb  Family Comments: BF here   OBS brochure/video discussed/provided to patient:  Yes  ED Medications:   Medications   ondansetron (ZOFRAN) injection 4 mg (4 mg Intravenous $Given 10/15/23 2358)   acetaminophen (TYLENOL) tablet 650 mg (has no administration in time range)     Or   acetaminophen (TYLENOL) Suppository 650 mg (has no administration in time range)   melatonin tablet 1 mg (has no administration in time range)   senna-docusate (SENOKOT-S/PERICOLACE) 8.6-50 MG per tablet 1 tablet (has no administration in time range)     Or   senna-docusate (SENOKOT-S/PERICOLACE) 8.6-50 MG per tablet 2 tablet (has no administration in time range)   polyethylene glycol (MIRALAX) Packet 17 g (has no administration in time range)   bisacodyl (DULCOLAX) suppository 10 mg (has no administration in time range)   ondansetron (ZOFRAN ODT) ODT tab 4 mg (has no administration in time range)     Or   ondansetron (ZOFRAN) injection 4 mg (has no administration in time range)   dextrose 5% and 0.45% NaCl + KCl 20 mEq/L infusion (has no administration in time range)   prochlorperazine (COMPAZINE) injection 10 mg (has no administration in time range)     Or   prochlorperazine (COMPAZINE) tablet 10 mg (has no administration in time range)     Or   prochlorperazine (COMPAZINE) suppository 25 mg (has no administration in time range)   LORazepam (ATIVAN) injection 0.5-1 mg (has no administration in time range)   sodium chloride 0.9% BOLUS 1,000 mL (0 mLs Intravenous Stopped 10/16/23 0028)   tranexamic acid (CYKLOKAPRON) 100 mg/mL inhalation solution 500 mg (500 mg Nebulization $Given 10/15/23 2358)   dexAMETHasone PF (DECADRON) injection 10 mg (10 mg Intravenous $Given  10/16/23 0036)       Drips infusing:  No  For the majority of the shift this patient was Green.   Interventions performed were na.    Sepsis treatment initiated: No    Cares/treatment/interventions/medications to be completed following ED care: IVF, ENT consult    ED Nurse Name: Ezio Hernandez RN  1:22 AM    RECEIVING UNIT ED HANDOFF REVIEW    Above ED Nurse Handoff Report was reviewed: Yes  Reviewed by: Johnnie Khalil RN on October 16, 2023 at 2:21 AM

## 2023-10-16 NOTE — PLAN OF CARE
ROOM # 206-1    Living Situation (if not independent, order SW consult):  Facility name:  : [facility] (mother) [phone]     Activity level at baseline: ind   Activity level on admit: ind     Who will be transporting you at discharge: [facility]     Patient registered to observation; given Patient Bill of Rights; given the opportunity to ask questions about observation status and their plan of care.  Patient has been oriented to the observation room, bathroom and call light is in place.    Discussed discharge goals and expectations with patient/family.

## 2023-10-17 VITALS
SYSTOLIC BLOOD PRESSURE: 124 MMHG | OXYGEN SATURATION: 100 % | DIASTOLIC BLOOD PRESSURE: 78 MMHG | HEART RATE: 57 BPM | HEIGHT: 64 IN | WEIGHT: 176.9 LBS | BODY MASS INDEX: 30.2 KG/M2 | RESPIRATION RATE: 16 BRPM | TEMPERATURE: 98.1 F

## 2023-10-17 LAB
ANION GAP SERPL CALCULATED.3IONS-SCNC: 9 MMOL/L (ref 7–15)
BUN SERPL-MCNC: 10.2 MG/DL (ref 6–20)
CALCIUM SERPL-MCNC: 9.5 MG/DL (ref 8.6–10)
CHLORIDE SERPL-SCNC: 103 MMOL/L (ref 98–107)
CREAT SERPL-MCNC: 0.58 MG/DL (ref 0.51–0.95)
DEPRECATED HCO3 PLAS-SCNC: 24 MMOL/L (ref 22–29)
EGFRCR SERPLBLD CKD-EPI 2021: >90 ML/MIN/1.73M2
ERYTHROCYTE [DISTWIDTH] IN BLOOD BY AUTOMATED COUNT: 11.4 % (ref 10–15)
GLUCOSE SERPL-MCNC: 170 MG/DL (ref 70–99)
HCT VFR BLD AUTO: 38.1 % (ref 35–47)
HGB BLD-MCNC: 13.4 G/DL (ref 11.7–15.7)
MCH RBC QN AUTO: 30.9 PG (ref 26.5–33)
MCHC RBC AUTO-ENTMCNC: 35.2 G/DL (ref 31.5–36.5)
MCV RBC AUTO: 88 FL (ref 78–100)
PLATELET # BLD AUTO: 273 10E3/UL (ref 150–450)
POTASSIUM SERPL-SCNC: 5 MMOL/L (ref 3.4–5.3)
RBC # BLD AUTO: 4.33 10E6/UL (ref 3.8–5.2)
SODIUM SERPL-SCNC: 136 MMOL/L (ref 135–145)
WBC # BLD AUTO: 8 10E3/UL (ref 4–11)

## 2023-10-17 PROCEDURE — 99239 HOSP IP/OBS DSCHRG MGMT >30: CPT | Performed by: NURSE PRACTITIONER

## 2023-10-17 PROCEDURE — 85027 COMPLETE CBC AUTOMATED: CPT | Performed by: NURSE PRACTITIONER

## 2023-10-17 PROCEDURE — 250N000011 HC RX IP 250 OP 636: Mod: JZ | Performed by: NURSE PRACTITIONER

## 2023-10-17 PROCEDURE — 96376 TX/PRO/DX INJ SAME DRUG ADON: CPT

## 2023-10-17 PROCEDURE — 258N000003 HC RX IP 258 OP 636: Performed by: NURSE PRACTITIONER

## 2023-10-17 PROCEDURE — G0378 HOSPITAL OBSERVATION PER HR: HCPCS

## 2023-10-17 PROCEDURE — 80048 BASIC METABOLIC PNL TOTAL CA: CPT | Performed by: NURSE PRACTITIONER

## 2023-10-17 PROCEDURE — 36415 COLL VENOUS BLD VENIPUNCTURE: CPT | Performed by: NURSE PRACTITIONER

## 2023-10-17 PROCEDURE — 250N000011 HC RX IP 250 OP 636: Mod: JZ | Performed by: HOSPITALIST

## 2023-10-17 RX ORDER — OXYCODONE HCL 5 MG/5 ML
5 SOLUTION, ORAL ORAL EVERY 6 HOURS PRN
Qty: 100 ML | Refills: 0 | Status: SHIPPED | OUTPATIENT
Start: 2023-10-17

## 2023-10-17 RX ORDER — AMOXICILLIN 250 MG
1 CAPSULE ORAL 2 TIMES DAILY PRN
Qty: 30 TABLET | Refills: 1 | Status: SHIPPED | OUTPATIENT
Start: 2023-10-17

## 2023-10-17 RX ORDER — ACETAMINOPHEN 325 MG/10.15ML
650 LIQUID ORAL EVERY 6 HOURS
Qty: 473 ML | Refills: 1 | Status: SHIPPED | OUTPATIENT
Start: 2023-10-17

## 2023-10-17 RX ORDER — ONDANSETRON 4 MG/1
4 TABLET, ORALLY DISINTEGRATING ORAL EVERY 6 HOURS PRN
Qty: 15 TABLET | Refills: 1 | Status: SHIPPED | OUTPATIENT
Start: 2023-10-17

## 2023-10-17 RX ADMIN — DEXAMETHASONE SODIUM PHOSPHATE 4 MG: 10 INJECTION, SOLUTION INTRAMUSCULAR; INTRAVENOUS at 05:41

## 2023-10-17 RX ADMIN — HYDROMORPHONE HYDROCHLORIDE 0.2 MG: 0.2 INJECTION, SOLUTION INTRAMUSCULAR; INTRAVENOUS; SUBCUTANEOUS at 06:47

## 2023-10-17 RX ADMIN — KETOROLAC TROMETHAMINE 30 MG: 30 INJECTION, SOLUTION INTRAMUSCULAR; INTRAVENOUS at 08:30

## 2023-10-17 RX ADMIN — CLINDAMYCIN PHOSPHATE 600 MG: 600 INJECTION, SOLUTION INTRAVENOUS at 02:38

## 2023-10-17 RX ADMIN — POTASSIUM CHLORIDE, DEXTROSE MONOHYDRATE AND SODIUM CHLORIDE: 150; 5; 450 INJECTION, SOLUTION INTRAVENOUS at 05:41

## 2023-10-17 RX ADMIN — DEXAMETHASONE SODIUM PHOSPHATE 4 MG: 10 INJECTION, SOLUTION INTRAMUSCULAR; INTRAVENOUS at 11:19

## 2023-10-17 ASSESSMENT — ACTIVITIES OF DAILY LIVING (ADL)
ADLS_ACUITY_SCORE: 19

## 2023-10-17 NOTE — PLAN OF CARE
PRIMARY DIAGNOSIS: Nausea and Vomiting   OUTPATIENT/OBSERVATION GOALS TO BE MET BEFORE DISCHARGE:  ADLs back to baseline: Yes    Activity and level of assistance: Ambulating independently.    Pain status: Improved but still requiring IV narcotics.    Return to near baseline physical activity: Yes     Discharge Planner Nurse   Safe discharge environment identified: Yes  Barriers to discharge: Yes       Entered by: Maribel Vaughn RN 10/17/2023 12:23 AM     Please review provider order for any additional goals.   Nurse to notify provider when observation goals have been met and patient is ready for discharge.    Vitals are Temp: 97.8  F (36.6  C) Temp src: Oral BP: 121/71 Pulse: 77   Resp: 18 SpO2: 100 %.  Patient is Alert and Oriented x4. They are independent with no assistive devices .  Pt is a Regular diet.  Patient has dextrose 5% and 0.45% NaCl + KCl 20 mEq/L infusion running at 100 mL per hour. Clindamycin q8hrs. Dexamethasone q6hrs. Plan is to discharge home when medically ready.

## 2023-10-17 NOTE — DISCHARGE SUMMARY
"[facility]  Hospitalist Discharge Summary      Date of Admission:  10/15/2023  Date of Discharge:  10/17/2023  Discharging Provider: JESUSITA Puckett CNP  Discharge Service: Hospitalist Service    Discharge Diagnoses   See below    Clinically Significant Risk Factors     # Obesity: Estimated body mass index is 30.36 kg/m  as calculated from the following:    Height as of this encounter: 1.626 m (5' 4\").    Weight as of this encounter: 80.2 kg (176 lb 14.4 oz).       Follow-ups Needed After Discharge   Follow-up Appointments     Follow-up and recommended labs and tests       Follow up with [facility] this next week.  Call [phone] with any   questions or concerns.      We are always happy to see you here at [facility].  Please know that   your surgeon does not come here but does go to [facility] in [place] and their group has privileges at [facility] in   [place].   If additional assistance is needed and you feel you need to be   seen in the ED, can consider these two options as well.        NA    Unresulted Labs Ordered in the Past 30 Days of this Admission       No orders found from 9/15/2023 to 10/16/2023.        These results will be followed up by NA    Discharge Disposition   Discharged to home  Condition at discharge: Stable    Hospital Course   Belen Dunlap is a 22 year old female no significant medical history but underwent a tonsillectomy on 10/11 by Dr. Cedeño ([facility]) at [facility] ([facility]) presents with intractable nausea and vomiting.  She was also seen here at [facility] on 10/12 and started on cephalexin suspension, magic mouth wash, and prednisolone.  She return on 10/15 due to concerns of ongoing symptoms and poor oral intake.     Acute medical issues:    #Recalcitrant nausea and vomiting of unclear etiology -- most likely pain (however, does have an underlying history of anxiety, so anxiety on differential) and recent " tonsillectomy.  She notes intermittent bleeding from the posterior oral pharynx which may also be contributing somewhat.  She tells us that she is not using much of her oxycodone and has not been able to tolerate oral medications given in the ED on 10/12 -- cephalexin, magic mouth wash, and prednisolone. Lab are reassuring.  She looks quite comfortable.     #Recent tonsillectomy with postoperative bleeding:  She endorses some posterior bleeding in the oral pharynx.  Likely irritation to the tonsillar area from vomiting.  Given TXA and local cares.  No evidence of bleeding at present.   Treated with IV dexamethasone and diet was able to be advanced to soft foods.  Discussed care with surgical team at [facility].  Treated with IV Clindamycin in the setting of recent surgery and post operative bleeding (was started on in the ED -- keflex but initially did not tolerate PO well).      Discharge plan:   -- regular diet -- try oral fluids and soft foods.  Ice cream, jello, pudding, etc.   -- staying hydrated is key.   -- stop PO oxycodone pills and use PO oxycodone 5mg/5mL q6hrs BTP.  -- APAP elixir 325mg (20.3 mL) q6hs ATC for the next several days.   -- Zofran ODT 4 mg Q6hrs PRN nausea/vomiting    Continue:  -- cephalexin suspension 700 mg (14 ml) TID for 5 days (started 10/12).  -- magic mouthwash 10 mL q6hrs PRN pain  -- prednislone 15 mg/5mL 45 mg daily for 5 days (started 10/12).    Follow up with [facility] later this week.      Chronic medical issues:  #Anxiety, depression, hx of SI:  Mildly anxious but appropriate.  Denies SI.   #Obesity: BMI 30.  Complicates cares.      Consultations This Hospital Stay   None  Discussed case with [facility] PA.    Code Status   Full Code    Time Spent on this Encounter   I, JESUSITA Puckett CNP, personally saw the patient today and spent greater than 30 minutes discharging this patient.       JESUSITA Puckett CNP  [facility] OBSERVATION DEPT  [address]  [facility] [address]  Phone: [phone]  ______________________________________________________________________    Physical Exam   Vital Signs: Temp: 97.9  F (36.6  C) Temp src: Oral BP: 122/78 Pulse: 54   Resp: 16 SpO2: 98 % O2 Device: None (Room air)    Weight: 176 lbs 14.4 oz    GEN:   Alert, oriented x 3, appears comfortable, NAD.  NECK:   Supple ,no mass or thyromegaly.  Mild LAD as expect (anterior)  HEENT:  Normocephalic/atraumatic, no scleral icterus, no nasal discharge, mouth moist. Posterior pharynx tonsillar surgical site without eschar or clot.  Moderate erythema. No trismus.  Tolerating oral secretions.  CV:   Regular rate and rhythm, no murmur or JVD.  S1 + S2 noted, no S3 or S4.  LUNGS:   Clear to auscultation bilaterally without rales/rhonchi/wheezing/retractions.  Symmetric chest rise on inhalation noted.  ABD:   Active bowel sounds, soft, non-tender/non-distended.  No rebound/guarding/rigidity.  EXT:   No edema.  No cyanosis.  No joint synovitis noted.  SKIN:   Dry to touch, no exanthems noted in the visualized areas.  Neurologic: Grossly intact,non focal. Spine: No cervical spine tenderness.   Neuropsychiatric:  General: normal, calm and normal eye contact  Level of consciousness: alert / normal  Affect: normal  Orientation: oriented to self, place, time and situation        Primary Care Physician   Physician No Ref-Primary    Discharge Orders      Reason for your hospital stay    Post tonsillectomy hemorrhage  Acute post operative pain  Post operative nausea and vomiting     Follow-up and recommended labs and tests     Follow up with [facility] this next week.  Call [phone] with any questions or concerns.      We are always happy to see you here at [facility].  Please know that your surgeon does not come here but does go to [facility] in [place] and their group has privileges at [facility].   If additional assistance is needed  and you feel you need to be seen in the ED, can consider these two options as well.     Activity    Your activity upon discharge: activity as tolerated and no driving while on analgesics     When to contact your care team    Call [phone] if you have any of the following: increased swelling, increased pain, fever > 101.5, inability to swallow, or any other questions or concerns.     Discharge Instructions    You will get through this!.  It takes time.  Total recovery can take 6-8 weeks with the first 1-2 weeks being the most challenging.      Use ice packs 20-30 minutes at a time, several times a day to help.       Stop taking Oxycodone pills.  I have ordered Oxycodone elixir (liquid form).    -----------------------------------------------------------  If you or a loved one is in a life-threatening situation, please call 911 or go to the nearest emergency room. In non-emergency situations, you can call the following [place] hotlines for information and support:    Aftercare Plan     [facility] Phone (free remote counseling): [phone]. Web address:   [email or web address]      If I am feeling unsafe or I am in a crisis, I will:   Contact my established care providers   Call the National Suicide Prevention Lifeline: 615.845.3690   Go to the nearest emergency room   Call 911      Warning signs that I or other people might notice when a crisis is developing for me:     I am having increasing suicidal thoughts that turn to plans with intent or means   I am having additional urges to self-harm    My emotions are of hopelessness; feeling like there's no way out.  Rage or anger.  Engaging in risky activities without thinking  Withdrawing from family/friends  Dramatic mood swings  Drastic personality changes   Use of alcohol or drugs  Postings on social media  Neglect of personal hygiene or cares      Things I am able to do on my own to cope or help me feel better:    Spending quality time with loved  ones  Staying hydrated  Eating balanced meals  Going for a walk every day  Take care of daily responsibilities/needs  Focus on positive self-talk vs negative self-talk     Things that I am able to do with others to cope or help me better:   Exercise  Music  Deep breathing  Meditations  Journal  Self-regulate  Self check-in  Ask for help     Things I can use or do for distraction:   Reach out to/spend time with family, friends  Shower  Exercise  Chores or do a project  Listen to music  Watch movie/TV  Listening to music  Journaling  Reading a book  Meditating  Call a friend     Changes I can make to support my mental health and wellness:    -I will abstain from all mood altering chemicals not currently prescribed to me    -I will attend scheduled mental health therapy and psychiatric appointments and follow all   recommendations  -I will commit to 30 minutes of self care daily - this can be as simple as taking a shower, going for a   walk, cooking a meal, read, writing, etc  -I will practice square breathing when I begin to feel anxious - in breath through the nose for the count   of 4 and the first line on the square. Out breath through the mouth for the count of 4 for the second line   of the square. Repeat to complete the square. Repeat the square as many times as needed.  - I will use distraction skills of: going for walks, watching TV, spending time outside, calling a friend or   family member  -Use community resources, including hotline numbers, Community Health crisis and support meetings  -Maintain a daily schedule/routine  -Practice deep breathing skills  -Download a meditation sapphire and spend 15-20 minutes per day mediating/relaxing. Some apps to   download include: Calm, Headspace and Insight Timer. All 3 of these apps have free version     Reduce Extreme Emotion  QUICKLY:  Changing Your Body Chemistry      T:  Change your body Temperature to change your autonomic nervous system   Use Ice Water to calm yourself down  "FAST   Put your face in a bowl of ice water (this is the best way; have the person keep his/her face in ice water for 30-45 seconds - initial research is showing that the longer s/he can hold her/his face in the water, the better the response), or   Splash ice water on your face, or hold an ice pack on your face      I:  Intensely exercise to calm down a body revved up by emotion   Examples: running, walking fast, jumping, playing basketball, weight lifting, swimming, calisthenics, etc.   Engage in exercises that DO NOT include violent behaviors. Exercises that utilize violent behaviors tend to function as \"behavioral rehearsal,\" and rather than calming the person down, may actually \"rev\" the person up more, increasing the likelihood of violence, and lessening the likelihood that they will \"burn off\" energy     P:  Progressively relax your muscles   Starting with your hands, moving to your forearms, upper arms, shoulders, neck, forehead, eyes, cheeks and lips, tongue and teeth, chest, upper back, stomach, buttocks, thighs, calves, ankles, feet   Tense (10 seconds,   of the way), then relax each muscle (all the way)   Notice the tension   Notice the difference when relaxed (by tensing first, and then relaxing, you are able to get a more thorough relaxation than by simply relaxing)      P: Paced breathing to relax   The standard technique is to begin with counting the number of steps one takes for a typical inhale, then counting the steps one takes for a typical exhale, and then lengthening the amount of steps for the exhalation by one or two steps.  OR  Repeat this pattern for 1-2 minutes  Inhale for four (4) seconds   Exhale for six (6) to eight (8) seconds   Research demonstrated that one can change one's overall level of anxiety by doing this exercise for even a few minutes per day       People in my life that I can ask for help:   Family  Friends  Providers     Your [facility] has a mental health crisis team you can " "call 24/7:   [facility] Crisis Line Number: [phone]  [facility] Mental Health Crisis: [phone] - Call the crisis line for immediate mental health support, 24 hours a day.   [facility] Crisis Line Number: [phone]  [facility] Crisis Line Number: [phone]  [facility] Crisis Line Number: [phone]   [facility] Crisis Line Number: [phone]  [place]: [phone]  [place]: [phone]  [facility] Crisis Number: [phone]  [facility] Crisis: [phone]     Other things that are important when I'm in crisis:   Ask for help     Additional resources and information:      Mental Health Apps  My3  [email or web address]     VirtualHopeBox  [email or web address]/virtual-hope-[email or web address]        Professionals or Agencies I Can Contact During A Crisis:        Crisis Lines  Call or Text 988 - National Suicide and Crisis Lifeline     Crisis Text Line  Text 253805  You will be connected with a trained live crisis counselor to provide support.     The [employer] (LGBTQ Youth Crisis Line)  8.181.699.1842  text START to 182-813     National Romulus on Mental Illness (CLOVIS)  460.404.4159 or 6.885.CLOVIS.HELPS     National Suicide Prevention Lifeline at 3-765-840-JYBT (3893)      Throughout  [place]: call **CRISIS (**799931)      Crisis Text Line: is available for free, 24/7 by texting [place] to 009220     Community Resources  Fast Tracker  Linking people to mental health and substance use disorder resources  fastLabArchivesckGarnet [employer]othe[employer]n.org      [place] Mental Health Warm Line  Peer to peer support  Monday thru Saturday, 12 pm to 10 pm  313.649.7359 or 1.589.683.4357  Text \"Support\" to 91281     National Romulus on Mental Illness (www.[place].clovis.org): 175.111.7060 or 078-278-2882     [facility] Phone (free remote counseling): [phone] Web address:   https://TouristR.[email or web address]/      www.psychologytoday.com (filter for insurance, gender " preference, etc.)     [facility]   [phone]  Intake appointment will be virtual, following appointments can be in person or virtual.   **IMMEDIATE OPENINGS**     [facility]  [phone]  *offers individual therapy, medication management and Mental Health Case Workers; can self refer     [facility]  [phone]  *Immediate Openings     [facility]  [phone]  *Immediate Openings     [facility]  [phone]  *Immediate Openings     Please follow up with scheduled providers to ensure all necessary paperwork is filled out prior to your   scheduled telehealth appointments.      Coordinators from [facility] will be calling within two business days to ensure   that you have the resources you may need or provide assistance with scheduling (Phone number: [phone].).     Remember: give the referrals 3 sessions prior to calling it quits. Do you trust them? Do you feel   understood? Do you think they can help? Check in with yourself after each session       [facility]: (358) 816-1454?    The [facility] is a 24/7 alcohol helpline where you can get information about alcohol use disorder and alcohol rehab. This hotline operates around the clock, but your call may occasionally go unanswered due to high call volumes or staff shortage. Please call back another time or call one of the other hotlines listed below.    Alcoholics Anonymous 1-328.706.7743    Find out more about the AA program or fellowship by calling the Alcoholics Anonymous number.    Alcohol Hotline for IAFF Members 1-572.593.6362    The alcohol hotline is in place to support firefighters and paramedics who are having issues with alcohol. It is for IAFF members and their family and friends.    [facility]: 7-834-528-HELP (2360)    The SAMHSA (Substance Abuse and Mental Health Services Administration) helpline is a U.S. government  initiative. This hotline gives you access to nationwide resources for alcohol use disorder treatment, including information and referrals to rehabs near you. The [facility] helpline is available 24/7 and offers services in both English and Latvian.    National Suicide Prevention Lifeline: 6-096-826-TALK (4350)    This is a national helpline for people in emotional distress with suicidal thoughts. Calls to the National Suicide Prevention Lifeline are free and confidential. The hotline operates 24/7.    National Poison Control: 8-624-658-7787    The U.S. Poison Control helpline provides information about the prevention and treatment of drug overdoses, including alcohol poisoning.     Diet    Follow this diet upon discharge: Orders Placed This Encounter      Regular Diet Adult    Try soft foods. I am more concerned about you staying hydrated than fed.   Appetite will come back and you will be able to eat again, I promise!!!!   However, it takes time and tonsil surgery is no joke.  Stay hydrated.  The goal is to drink a minimum of 60-90 ounces of non carbonated, non caffeinated beverages daily.    Ice cream, popsicles, jello, pudding,       Significant Results and Procedures   Most Recent 3 CBC's:  Recent Labs   Lab Test 10/17/23  0732 10/15/23  2339 10/12/23  0331   WBC 8.0 8.3 12.8*   HGB 13.4 14.2 13.1   MCV 88 90 91    277 218     Most Recent 3 BMP's:  Recent Labs   Lab Test 10/17/23  0732 10/15/23  2339 10/12/23  0331    139 133*   POTASSIUM 5.0 3.8 4.0   CHLORIDE 103 102 101   CO2 24 24 21*   BUN 10.2 18.6 11.3   CR 0.58 0.73 0.63   ANIONGAP 9 13 11   BEBO 9.5 9.6 9.5   * 89 93     7-Day Micro Results       No results found for the last 168 hours.        ,   Results for orders placed or performed during the hospital encounter of 08/28/23   US Pelvis Cmplt w Transvag & Doppler LmtPel Duplex Limited    Narrative    EXAM: US PELVIS COMPLETE W TRANSVAGINAL AND DOPPLER LIMITED  LOCATION: [facility]  DATE: 8/28/2023    INDICATION: left Pelvic pain  COMPARISON: None.  TECHNIQUE: Transabdominal scans were performed. Endovaginal ultrasound was performed to better visualize the adnexa. Color flow with spectral Doppler and waveform analysis performed.    FINDINGS:    UTERUS: 6.2 x 3.3 x 4.0 cm. Normal in size and position with no masses.    ENDOMETRIUM: 1-2 mm. Normal smooth endometrium.    RIGHT OVARY: 2.9 x 1.8 x 2.3 cm. Normal with arterial and venous duplex flow identified.    LEFT OVARY: 3.0 x 1.9 x 1.2 cm. Normal with arterial and venous duplex flow identified.    No significant free fluid.      Impression    IMPRESSION:    1.  Negative pelvic ultrasound.         CT Abdomen Pelvis w/o Contrast    Narrative    EXAM: CT ABDOMEN PELVIS W/O CONTRAST  LOCATION: [facility]  DATE: 8/28/2023    INDICATION: LLQ and flank pain  COMPARISON: None.  TECHNIQUE: CT scan of the abdomen and pelvis was performed without IV contrast. Multiplanar reformats were obtained. Dose reduction techniques were used.  CONTRAST: None.    FINDINGS:   LOWER CHEST: No basilar infiltrates    HEPATOBILIARY: No biliary dilatation    PANCREAS: Unremarkable    SPLEEN: Unremarkable    ADRENAL GLANDS: Unremarkable    KIDNEYS/BLADDER: Mild left hydronephrosis. 2 to 3 mm calculus identified at the level of the left ureterovesical junction. Bladder is decompressed.    BOWEL: No bowel obstruction.    LYMPH NODES: No significant retroperitoneal adenopathy    VASCULATURE: No abdominal aortic aneurysm    PELVIC ORGANS: No free fluid    MUSCULOSKELETAL: No acute bony abnormalities.      Impression    IMPRESSION:   1.  2 to 3 mm obstructing calculus at the left UVJ.         Discharge Medications   Current Discharge Medication List        START taking these medications    Details   acetaminophen (TYLENOL) 325 MG/10.15ML solution Take 20.3 mLs (650 mg) by mouth every 6 hours  Qty: 473 mL, Refills: 1    Associated  Diagnoses: Tonsillar bleed; Post-tonsillectomy pain      ondansetron (ZOFRAN ODT) 4 MG ODT tab Take 1 tablet (4 mg) by mouth every 6 hours as needed for nausea or vomiting  Qty: 15 tablet, Refills: 1    Associated Diagnoses: Postoperative vomiting; Tonsillar bleed; Post-tonsillectomy pain      oxyCODONE (ROXICODONE) 5 MG/5ML solution Take 5 mLs (5 mg) by mouth every 6 hours as needed for severe pain  Qty: 100 mL, Refills: 0    Associated Diagnoses: Tonsillar bleed; Post-tonsillectomy pain      senna-docusate (SENOKOT-S/PERICOLACE) 8.6-50 MG tablet Take 1 tablet by mouth 2 times daily as needed for constipation  Qty: 30 tablet, Refills: 1    Associated Diagnoses: Tonsillar bleed; Post-tonsillectomy pain           CONTINUE these medications which have NOT CHANGED    Details   albuterol (PROAIR HFA/PROVENTIL HFA/VENTOLIN HFA) 108 (90 Base) MCG/ACT inhaler Inhale 2 puffs into the lungs every 6 hours as needed for shortness of breath / dyspnea or wheezing    Comments: Pharmacy may dispense brand covered by insurance (Proair, or proventil or ventolin or generic albuterol inhaler)      cephALEXin (KEFLEX) 250 MG/5ML suspension Take 14 mLs (700 mg) by mouth 3 times daily for 5 days  Qty: 210 mL, Refills: 0      magic mouthwash suspension (diphenhydrAMINE, lidocaine, aluminum-magnesium & simethicone) Swish and swallow 10 mLs in mouth every 6 hours as needed for mouth sores or sore throat  Qty: 120 mL, Refills: 0      prednisoLONE (ORAPRED/PRELONE) 15 MG/5ML solution Take 15 mLs (45 mg) by mouth daily for 5 days  Qty: 25 mL, Refills: 0           STOP taking these medications       oxyCODONE (ROXICODONE) 5 MG tablet Comments:   Reason for Stopping:             Allergies   No Known Allergies

## 2023-10-17 NOTE — PLAN OF CARE
PRIMARY DIAGNOSIS: N/V; Recent Tonsillectomy - Pain  OUTPATIENT/OBSERVATION GOALS TO BE MET BEFORE DISCHARGE:  1. Pain Status: Improved but still requiring IV medication.    2. Return to near baseline physical activity: Yes - independent in room  3. Cleared for discharge by consultants (if involved): N/A    Discharge Planner Nurse   Safe discharge environment identified: Yes  Barriers to discharge: Yes       Entered by: Solange Olguin RN 10/17/2023    Pt is A&Ox4, VSS on RA. Reporting 7/10 pain from tonsillectomy reporting more on the left side today. Tonsil site has white patches and swollen/tender when feeling throat. Given IV Toradol and ice compression. IV infusing with D5% and 0.45% NaCl + Kcl 20 mEq/L. Pt reports going to attempt PO intake. Independent in the room - able to make needs known.       Please review provider order for any additional goals. Nurse to notify provider when observation goals have been met and patient is ready for discharge.

## 2023-10-17 NOTE — DISCHARGE SUMMARY
Patient's After Visit Summary was reviewed with patient.  Patient verbalized understanding of After Visit Summary, recommended follow up and was given an opportunity to ask questions.   Discharge medications sent home with patient/family: Not applicable   Discharged with other:Friend transport home    Pt educated on AVS, given Ice packed, gave last dose of steroid medication and removed IV. Walked down independently to transport.       OBSERVATION patient END time: 1845

## 2023-10-17 NOTE — PLAN OF CARE
PRIMARY DIAGNOSIS: Nausea and Vomiting   OUTPATIENT/OBSERVATION GOALS TO BE MET BEFORE DISCHARGE:  ADLs back to baseline: Yes    Activity and level of assistance: Ambulating independently.    Pain status: Improved but still requiring IV narcotics.    Return to near baseline physical activity: Yes     Discharge Planner Nurse   Safe discharge environment identified: Yes  Barriers to discharge: Yes       Entered by: Maribel Vaughn RN 10/17/2023 12:18 AM     Please review provider order for any additional goals.   Nurse to notify provider when observation goals have been met and patient is ready for discharge.    Vitals are Temp: 97.8  F (36.6  C) Temp src: Oral BP: 121/71 Pulse: 77   Resp: 18 SpO2: 100 %.  Patient is Alert and Oriented x4. They are independent with no assistive devices .  Pt is a Regular diet.  They are complaining of 4/10 pain in their throat.  Cold pack applied. Patient has dextrose 5% and 0.45% NaCl + KCl 20 mEq/L infusion running at 100 mL per hour. C/o some nausea, PRN Zofran administered. Clindamycin q8hrs. Dexamethasone q6hrs. Minimal oral intake. Denies oral bleeding. Plan is to discharge home when medically ready.

## 2023-10-17 NOTE — PLAN OF CARE
PRIMARY DIAGNOSIS: Nausea and Vomiting   OUTPATIENT/OBSERVATION GOALS TO BE MET BEFORE DISCHARGE:  ADLs back to baseline: Yes    Activity and level of assistance: Ambulating independently.    Pain status: Improved but still requiring IV narcotics.    Return to near baseline physical activity: Yes     Discharge Planner Nurse   Safe discharge environment identified: Yes  Barriers to discharge: Yes       Entered by: Maribel Vaughn RN 10/17/2023 5:04 AM     Please review provider order for any additional goals.   Nurse to notify provider when observation goals have been met and patient is ready for discharge.    Vitals are Temp: 97.3  F (36.3  C) Temp src: Oral BP: 115/78 Pulse: 71   Resp: 18 SpO2: 100 %.  Patient is Alert and Oriented x4. They are independent with no assistive devices .  Pt is a Regular diet.  Patient has dextrose 5% and 0.45% NaCl + KCl 20 mEq/L infusion running at 100 mL per hour. Clindamycin q8hrs. Dexamethasone q6hrs. Plan is to discharge home when medically ready.

## 2023-10-19 ENCOUNTER — PATIENT OUTREACH (OUTPATIENT)
Dept: CARE COORDINATION | Facility: CLINIC | Age: 23
End: 2023-10-19
Payer: COMMERCIAL

## 2023-10-19 NOTE — PROGRESS NOTES
Connected [facility] Resource [facility] Contact  [facility]/Voicemail     Clinical Data: Post-Discharge Outreach     Outreach attempted x 2.  Voicemail full, unable to leave a message.      Plan:  [facility] will do no further outreaches at this time.       Nano Narayan RN  [facility] [place]    *Connected Care Resource Team does NOT follow patient ongoing. Referrals are identified based on internal discharge reports and the outreach is to ensure patient has an understanding of their discharge instructions.

## 2024-04-02 ENCOUNTER — HOSPITAL ENCOUNTER (EMERGENCY)
Facility: CLINIC | Age: 24
Discharge: HOME | End: 2024-04-02
Attending: EMERGENCY MEDICINE | Admitting: EMERGENCY MEDICINE

## 2024-04-02 ENCOUNTER — APPOINTMENT (OUTPATIENT)
Dept: CT IMAGING | Facility: CLINIC | Age: 24
End: 2024-04-02
Attending: EMERGENCY MEDICINE

## 2024-04-02 VITALS
OXYGEN SATURATION: 100 % | RESPIRATION RATE: 16 BRPM | WEIGHT: 184.08 LBS | BODY MASS INDEX: 31.43 KG/M2 | DIASTOLIC BLOOD PRESSURE: 62 MMHG | TEMPERATURE: 98.2 F | SYSTOLIC BLOOD PRESSURE: 118 MMHG | HEIGHT: 64 IN | HEART RATE: 68 BPM

## 2024-04-02 DIAGNOSIS — J02.9 PHARYNGITIS, UNSPECIFIED ETIOLOGY: ICD-10-CM

## 2024-04-02 LAB
ANION GAP SERPL CALCULATED.3IONS-SCNC: 11 MMOL/L (ref 7–15)
BASOPHILS # BLD AUTO: 0.1 10E3/UL (ref 0–0.2)
BASOPHILS NFR BLD AUTO: 1 %
BUN SERPL-MCNC: 14.6 MG/DL (ref 6–20)
CALCIUM SERPL-MCNC: 9.3 MG/DL (ref 8.6–10)
CHLORIDE SERPL-SCNC: 106 MMOL/L (ref 98–107)
CREAT SERPL-MCNC: 0.7 MG/DL (ref 0.51–0.95)
DEPRECATED HCO3 PLAS-SCNC: 20 MMOL/L (ref 22–29)
EGFRCR SERPLBLD CKD-EPI 2021: >90 ML/MIN/1.73M2
EOSINOPHIL # BLD AUTO: 0.2 10E3/UL (ref 0–0.7)
EOSINOPHIL NFR BLD AUTO: 1 %
ERYTHROCYTE [DISTWIDTH] IN BLOOD BY AUTOMATED COUNT: 11.9 % (ref 10–15)
FLUAV RNA SPEC QL NAA+PROBE: NEGATIVE
FLUBV RNA RESP QL NAA+PROBE: NEGATIVE
GLUCOSE SERPL-MCNC: 97 MG/DL (ref 70–99)
GROUP A STREP BY PCR: NOT DETECTED
HCG SER QL IA.RAPID: NEGATIVE
HCT VFR BLD AUTO: 36.8 % (ref 35–47)
HGB BLD-MCNC: 12.7 G/DL (ref 11.7–15.7)
IMM GRANULOCYTES # BLD: 0 10E3/UL
IMM GRANULOCYTES NFR BLD: 0 %
LYMPHOCYTES # BLD AUTO: 2.1 10E3/UL (ref 0.8–5.3)
LYMPHOCYTES NFR BLD AUTO: 17 %
MCH RBC QN AUTO: 31.4 PG (ref 26.5–33)
MCHC RBC AUTO-ENTMCNC: 34.5 G/DL (ref 31.5–36.5)
MCV RBC AUTO: 91 FL (ref 78–100)
MONOCYTES # BLD AUTO: 1.1 10E3/UL (ref 0–1.3)
MONOCYTES NFR BLD AUTO: 9 %
NEUTROPHILS # BLD AUTO: 9.1 10E3/UL (ref 1.6–8.3)
NEUTROPHILS NFR BLD AUTO: 72 %
NRBC # BLD AUTO: 0 10E3/UL
NRBC BLD AUTO-RTO: 0 /100
PLATELET # BLD AUTO: 241 10E3/UL (ref 150–450)
POTASSIUM SERPL-SCNC: 4.3 MMOL/L (ref 3.4–5.3)
RBC # BLD AUTO: 4.04 10E6/UL (ref 3.8–5.2)
RSV RNA SPEC NAA+PROBE: NEGATIVE
SARS-COV-2 RNA RESP QL NAA+PROBE: NEGATIVE
SODIUM SERPL-SCNC: 137 MMOL/L (ref 135–145)
WBC # BLD AUTO: 12.6 10E3/UL (ref 4–11)

## 2024-04-02 PROCEDURE — 87637 SARSCOV2&INF A&B&RSV AMP PRB: CPT | Performed by: EMERGENCY MEDICINE

## 2024-04-02 PROCEDURE — 84703 CHORIONIC GONADOTROPIN ASSAY: CPT

## 2024-04-02 PROCEDURE — 250N000011 HC RX IP 250 OP 636: Performed by: EMERGENCY MEDICINE

## 2024-04-02 PROCEDURE — 87651 STREP A DNA AMP PROBE: CPT | Performed by: EMERGENCY MEDICINE

## 2024-04-02 PROCEDURE — 99285 EMERGENCY DEPT VISIT HI MDM: CPT | Mod: 25

## 2024-04-02 PROCEDURE — 80048 BASIC METABOLIC PNL TOTAL CA: CPT | Performed by: EMERGENCY MEDICINE

## 2024-04-02 PROCEDURE — 250N000013 HC RX MED GY IP 250 OP 250 PS 637: Performed by: EMERGENCY MEDICINE

## 2024-04-02 PROCEDURE — 36415 COLL VENOUS BLD VENIPUNCTURE: CPT | Performed by: EMERGENCY MEDICINE

## 2024-04-02 PROCEDURE — 96374 THER/PROPH/DIAG INJ IV PUSH: CPT | Mod: 59

## 2024-04-02 PROCEDURE — 70491 CT SOFT TISSUE NECK W/DYE: CPT

## 2024-04-02 PROCEDURE — 85025 COMPLETE CBC W/AUTO DIFF WBC: CPT | Performed by: EMERGENCY MEDICINE

## 2024-04-02 RX ORDER — KETOROLAC TROMETHAMINE 15 MG/ML
15 INJECTION, SOLUTION INTRAMUSCULAR; INTRAVENOUS ONCE
Status: COMPLETED | OUTPATIENT
Start: 2024-04-02 | End: 2024-04-02

## 2024-04-02 RX ORDER — METHOCARBAMOL 500 MG/1
1000 TABLET, FILM COATED ORAL ONCE
Status: COMPLETED | OUTPATIENT
Start: 2024-04-02 | End: 2024-04-02

## 2024-04-02 RX ORDER — IOPAMIDOL 755 MG/ML
500 INJECTION, SOLUTION INTRAVASCULAR ONCE
Status: COMPLETED | OUTPATIENT
Start: 2024-04-02 | End: 2024-04-02

## 2024-04-02 RX ORDER — ACETAMINOPHEN 500 MG
1000 TABLET ORAL ONCE
Status: COMPLETED | OUTPATIENT
Start: 2024-04-02 | End: 2024-04-02

## 2024-04-02 RX ADMIN — ACETAMINOPHEN 1000 MG: 500 TABLET, FILM COATED ORAL at 03:42

## 2024-04-02 RX ADMIN — AMOXICILLIN AND CLAVULANATE POTASSIUM 1 TABLET: 875; 125 TABLET, FILM COATED ORAL at 04:46

## 2024-04-02 RX ADMIN — Medication 10 MG: at 04:46

## 2024-04-02 RX ADMIN — METHOCARBAMOL 1000 MG: 500 TABLET ORAL at 03:42

## 2024-04-02 RX ADMIN — KETOROLAC TROMETHAMINE 15 MG: 15 INJECTION, SOLUTION INTRAMUSCULAR; INTRAVENOUS at 03:43

## 2024-04-02 RX ADMIN — IOPAMIDOL 90 ML: 755 INJECTION, SOLUTION INTRAVENOUS at 04:05

## 2024-04-02 ASSESSMENT — ACTIVITIES OF DAILY LIVING (ADL)
ADLS_ACUITY_SCORE: 37
ADLS_ACUITY_SCORE: 37
ADLS_ACUITY_SCORE: 35

## 2024-04-02 ASSESSMENT — COLUMBIA-SUICIDE SEVERITY RATING SCALE - C-SSRS
6. HAVE YOU EVER DONE ANYTHING, STARTED TO DO ANYTHING, OR PREPARED TO DO ANYTHING TO END YOUR LIFE?: NO
2. HAVE YOU ACTUALLY HAD ANY THOUGHTS OF KILLING YOURSELF IN THE PAST MONTH?: NO
1. IN THE PAST MONTH, HAVE YOU WISHED YOU WERE DEAD OR WISHED YOU COULD GO TO SLEEP AND NOT WAKE UP?: NO

## 2024-04-02 NOTE — ED TRIAGE NOTES
Presents to triage with c/o non traumatic neck pain and pharyngitis that started about 2 days ago. Patient states neck pain is on both sides of the neck and feels like a muscle spasm. Throat pain started at the same time. Denies all other symptoms.

## 2024-04-02 NOTE — DISCHARGE INSTRUCTIONS
-Take acetaminophen 500 to 1000 mg by mouth every 4 to 6 hours as needed for pain or fever.  Do not take more than 4000 mg in 24 hours.  Do not take within 6 hours of another acetaminophen containing medication such as norco (vicodin) or percocet.  - Take ibuprofen 600 to 800 mg by mouth every 6 to 8 hours as needed for pain or fever      Discharge Instructions  Sore Throat  You were seen today for a sore throat, in medical terms this is called pharyngitis. A sore throat is most often caused by viral or bacterial infections; most of which (>80%) are caused by a virus. Viral infections are not treated with antibiotics, treatment for a viral sore throat consists mostly of treating symptoms (such as pain and fever) with over-the-counter pain relievers/fever reducers.  Strep throat is a kind of sore throat caused by Group A streptococcus bacteria.  This type of sore throat is treated with antibiotics so we test for this with a  strep test  and, if positive, prescribe antibiotics.  Generally, every Emergency Department visit should have a follow-up clinic visit with either a primary or a specialty clinic/provider. Please follow-up as instructed by your emergency provider today.  Return to the Emergency Department if:  If you have difficulty breathing.  If you are drooling because you are unable to swallow.  You become dehydrated due to difficulty drinking. Signs of dehydration include weakness, dry mouth, and urinating less than 3 times per day.  If you develop swelling of the neck or tongue.  If you develop a high fever with either severe or unusual headache or stiff neck.    Treatment:    Pain relief -- Non-prescription pain medications, such as Tylenol  (acetaminophen) or Motrin , Advil  (ibuprofen) are usually recommended for pain.  Do not use a medicine that you are allergic to, or if your provider has told you not to use it.  Soft or liquid diet. Concentrate on liquids to keep yourself hydrated. Cold liquids  (popsicles, ice cream, etc.) may feel good on your throat.  If you were given a prescription for medicine here today, be sure to read all of the information (including the package insert) that comes with your prescription.  This will include important information about the medicine, its side effects, and any warnings that you need to know about.  The pharmacist who fills the prescription can provide more information and answer questions you may have about the medicine.  If you have questions or concerns that the pharmacist cannot address, please call or return to the Emergency Department.   Remember that you can always come back to the Emergency Department if you are not able to see your regular provider in the amount of time listed above, if you get any new symptoms, or if there is anything that worries you.

## 2024-04-02 NOTE — ED PROVIDER NOTES
History     Chief Complaint:  Neck Pain and Pharyngitis       HPI   Belen Dunlap is a 23 year old female with a past medical history significant for status post tonsillectomy in October 2023 who presents to the ED via/accompanied by significant other with a chief complaint of spasm-like bilateral neck pain and sore throat onset 2 days ago with limited range of motion laterally although the patient reports that she is able to flex and extend at her neck.  She denies fevers, chills, recent trauma.  She reports her throat hurts worse with swallowing.  She denies difficulty breathing, chest pain, shortness of breath, nausea, vomiting, fevers, chills.  She reports that she is taking ibuprofen with minimal improvement..      Independent Historian: history provided by the patient    Review of External Notes: See MDM        Allergies:  No Known Allergies     Medications:    amoxicillin-clavulanate (AUGMENTIN) 875-125 MG tablet  acetaminophen (TYLENOL) 325 MG/10.15ML solution  albuterol (PROAIR HFA/PROVENTIL HFA/VENTOLIN HFA) 108 (90 Base) MCG/ACT inhaler  magic mouthwash suspension (diphenhydrAMINE, lidocaine, aluminum-magnesium & simethicone)  ondansetron (ZOFRAN ODT) 4 MG ODT tab  oxyCODONE (ROXICODONE) 5 MG/5ML solution  senna-docusate (SENOKOT-S/PERICOLACE) 8.6-50 MG tablet        Past Medical History:    Past Medical History:   Diagnosis Date    Anxiety     Depression     Depressive disorder        Past Surgical History:    Past Surgical History:   Procedure Laterality Date    wisdom teeth extraction           Family History:    family history includes Hyperlipidemia in her father; Skin Cancer in her mother.    Social History:   reports that she has never smoked. She has never used smokeless tobacco. She reports current alcohol use of about 4.0 standard drinks of alcohol per week. She reports current drug use. Drug: Marijuana.  PCP: No Ref-Primary, Physician     Physical Exam   Patient Vitals for the past 24  "hrs:   BP Temp Temp src Pulse Resp SpO2 Height Weight   04/02/24 0137 121/67 -- -- -- -- -- -- --   04/02/24 0133 -- 98.2  F (36.8  C) Temporal 77 18 100 % 1.626 m (5' 4\") 83.5 kg (184 lb 1.4 oz)        Physical Exam  Constitutional: Well developed, nontox appearance  Head: Atraumatic.   Mouth/Throat: Oropharynx is clear and moist.  No trismus  Neck:  no stridor, bilateral range of motion limited secondary to pain, flexion and extension normal  Eyes: no scleral icterus  Cardiovascular: RRR, 2+ bilat radial pulses  Pulmonary/Chest: nml resp effort  Ext: Warm, well perfused  Neurological: A&O, symmetric facies, moves ext x4  Skin: Skin is warm and dry.   Psychiatric: Behavior is normal. Thought content normal.   Nursing note and vitals reviewed.    Emergency Department Course   ECG:  ECG results from 01/08/22   EKG 12-lead, tracing only     Value    Systolic Blood Pressure     Diastolic Blood Pressure     Ventricular Rate 85    Atrial Rate 85    MA Interval 132    QRS Duration 86        QTc 433    P Axis 55    R AXIS 62    T Axis 39    Interpretation ECG      Sinus rhythm  Normal ECG  No previous ECGs available         Imaging:  Soft tissue neck CT w contrast   Final Result   IMPRESSION:    1.  Pharyngitis with reactive cervical adenopathy. No drainable fluid collection.           Report per radiology unless otherwise specified in report or noted in MDM    Laboratory:  Labs Ordered and Resulted from Time of ED Arrival to Time of ED Departure   BASIC METABOLIC PANEL - Abnormal       Result Value    Sodium 137      Potassium 4.3      Chloride 106      Carbon Dioxide (CO2) 20 (*)     Anion Gap 11      Urea Nitrogen 14.6      Creatinine 0.70      GFR Estimate >90      Calcium 9.3      Glucose 97     CBC WITH PLATELETS AND DIFFERENTIAL - Abnormal    WBC Count 12.6 (*)     RBC Count 4.04      Hemoglobin 12.7      Hematocrit 36.8      MCV 91      MCH 31.4      MCHC 34.5      RDW 11.9      Platelet Count 241      % " Neutrophils 72      % Lymphocytes 17      % Monocytes 9      % Eosinophils 1      % Basophils 1      % Immature Granulocytes 0      NRBCs per 100 WBC 0      Absolute Neutrophils 9.1 (*)     Absolute Lymphocytes 2.1      Absolute Monocytes 1.1      Absolute Eosinophils 0.2      Absolute Basophils 0.1      Absolute Immature Granulocytes 0.0      Absolute NRBCs 0.0     INFLUENZA A/B, RSV, & SARS-COV2 PCR - Normal    Influenza A PCR Negative      Influenza B PCR Negative      RSV PCR Negative      SARS CoV2 PCR Negative     ISTAT HCG QUALITATIVE PREGNANCY POCT - Normal    HCG Qualitative POCT Negative     GROUP A STREPTOCOCCUS PCR THROAT SWAB - Normal    Group A strep by PCR Not Detected          Procedures       Emergency Department Course & Assessments:             Interventions:  Medications   methocarbamol (ROBAXIN) tablet 1,000 mg (1,000 mg Oral $Given 24)   acetaminophen (TYLENOL) tablet 1,000 mg (1,000 mg Oral $Given 24)   ketorolac (TORADOL) injection 15 mg (15 mg Intravenous $Given 24 0343)   iopamidol (ISOVUE-370) solution 500 mL (90 mLs Intravenous $Given 24 0405)   sodium chloride (PF) 0.9% PF flush 100 mL (65 mLs Intravenous $Given 24 0405)   dexAMETHasone (DECADRON) alcohol-free oral solution 10 mg (10 mg Oral $Given 24 0446)   amoxicillin-clavulanate (AUGMENTIN) 875-125 MG per tablet 1 tablet (1 tablet Oral $Given 246)        Independent Interpretation (X-rays, CTs, rhythm strip):  See MDM    Consultations/Discussion of Management or Tests:  None    Social Determinants of Health affecting care:  See MDM    Disposition:  The patient was discharged to home.     Impression & Plan    CMS Diagnoses: None    MIPS (If applicable):  N/A    Medical Decision Makin year old female presenting w/ neck pain, sore throat    Social determinants affecting patient's health include: Marijuana use otherwise no significant social determinants negatively affecting the patient's  health     I reviewed medical records from hospital admission on 10/15/2023    DDx includes neck pain NOS, torticollis, muscle strain, muscle spasm, viral pharyngitis, retropharyngeal abscess.  Doubt meningitis encephalitis given history and physical exam.  Labs significant for mild leukocytosis.  Imaging sig for findings consistent with pharyngitis.  Given level of patient's pain, reactive cervical lymphadenopathy and findings noted on CT, I think would be reasonable to treat the patient with antibiotics.  First dose antibiotics given as noted above as well as dexamethasone for symptom control.  Given reassuring vital signs and the patient is tolerating p.o., I feel she is safe for discharge for outpatient management.   Recommendations given regarding follow up with PCP and return to the emergency department as needed for new or worsening symptoms.  Patient counseled on all results, disposition and diagnosis.  They are understanding and agreeable to plan. Patient discharged in stable condition.        Diagnosis:    ICD-10-CM    1. Pharyngitis, unspecified etiology  J02.9            Discharge Medications:  New Prescriptions    AMOXICILLIN-CLAVULANATE (AUGMENTIN) 875-125 MG TABLET    Take 1 tablet by mouth 2 times daily for 19 doses        4/2/2024   Javier Restrepo MD Vaughn, Christopher E, MD  04/02/24 0689

## 2024-12-01 ENCOUNTER — HEALTH MAINTENANCE LETTER (OUTPATIENT)
Age: 24
End: 2024-12-01

## (undated) RX ORDER — ONDANSETRON 2 MG/ML
INJECTION INTRAMUSCULAR; INTRAVENOUS
Status: DISPENSED
Start: 2023-08-28

## (undated) RX ORDER — KETOROLAC TROMETHAMINE 15 MG/ML
INJECTION, SOLUTION INTRAMUSCULAR; INTRAVENOUS
Status: DISPENSED
Start: 2023-08-28